# Patient Record
Sex: FEMALE | Race: BLACK OR AFRICAN AMERICAN | Employment: FULL TIME | URBAN - METROPOLITAN AREA
[De-identification: names, ages, dates, MRNs, and addresses within clinical notes are randomized per-mention and may not be internally consistent; named-entity substitution may affect disease eponyms.]

---

## 2018-03-29 ENCOUNTER — HOSPITAL ENCOUNTER (EMERGENCY)
Age: 38
Discharge: HOME | End: 2018-03-29
Attending: EMERGENCY MEDICINE
Payer: COMMERCIAL

## 2018-03-29 ENCOUNTER — APPOINTMENT (OUTPATIENT)
Dept: CT IMAGING | Age: 38
End: 2018-03-29
Payer: COMMERCIAL

## 2018-03-29 VITALS
HEIGHT: 65 IN | TEMPERATURE: 97.8 F | DIASTOLIC BLOOD PRESSURE: 75 MMHG | SYSTOLIC BLOOD PRESSURE: 142 MMHG | RESPIRATION RATE: 18 BRPM | HEART RATE: 74 BPM | OXYGEN SATURATION: 100 % | BODY MASS INDEX: 33.99 KG/M2 | WEIGHT: 204 LBS

## 2018-03-29 DIAGNOSIS — K52.9 ENTERITIS: Primary | ICD-10-CM

## 2018-03-29 LAB
ABSOLUTE EOS #: 0.2 K/UL (ref 0–0.4)
ABSOLUTE IMMATURE GRANULOCYTE: ABNORMAL K/UL (ref 0–0.3)
ABSOLUTE LYMPH #: 2.6 K/UL (ref 1–4.8)
ABSOLUTE MONO #: 0.5 K/UL (ref 0.2–0.8)
ANION GAP SERPL CALCULATED.3IONS-SCNC: 12 MMOL/L (ref 9–17)
BASOPHILS # BLD: 1 % (ref 0–2)
BASOPHILS ABSOLUTE: 0.1 K/UL (ref 0–0.2)
BUN BLDV-MCNC: 10 MG/DL (ref 6–20)
BUN/CREAT BLD: 13 (ref 9–20)
CALCIUM SERPL-MCNC: 9.1 MG/DL (ref 8.6–10.4)
CHLORIDE BLD-SCNC: 102 MMOL/L (ref 98–107)
CHP ED QC CHECK: NORMAL
CO2: 24 MMOL/L (ref 20–31)
CREAT SERPL-MCNC: 0.75 MG/DL (ref 0.5–0.9)
DIFFERENTIAL TYPE: ABNORMAL
EOSINOPHILS RELATIVE PERCENT: 2 % (ref 1–4)
GFR AFRICAN AMERICAN: >60 ML/MIN
GFR NON-AFRICAN AMERICAN: >60 ML/MIN
GFR SERPL CREATININE-BSD FRML MDRD: NORMAL ML/MIN/{1.73_M2}
GFR SERPL CREATININE-BSD FRML MDRD: NORMAL ML/MIN/{1.73_M2}
GLUCOSE BLD-MCNC: 99 MG/DL (ref 70–99)
HCT VFR BLD CALC: 42.9 % (ref 36–46)
HEMOGLOBIN: 14.5 G/DL (ref 12–16)
IMMATURE GRANULOCYTES: ABNORMAL %
LYMPHOCYTES # BLD: 31 % (ref 24–44)
MCH RBC QN AUTO: 27 PG (ref 26–34)
MCHC RBC AUTO-ENTMCNC: 33.9 G/DL (ref 31–37)
MCV RBC AUTO: 79.5 FL (ref 80–100)
MONOCYTES # BLD: 6 % (ref 1–7)
NRBC AUTOMATED: ABNORMAL PER 100 WBC
PDW BLD-RTO: 13.5 % (ref 11.5–14.5)
PLATELET # BLD: 285 K/UL (ref 130–400)
PLATELET ESTIMATE: ABNORMAL
PMV BLD AUTO: ABNORMAL FL (ref 6–12)
POTASSIUM SERPL-SCNC: 4.2 MMOL/L (ref 3.7–5.3)
RBC # BLD: 5.39 M/UL (ref 4–5.2)
RBC # BLD: ABNORMAL 10*6/UL
SEG NEUTROPHILS: 60 % (ref 36–66)
SEGMENTED NEUTROPHILS ABSOLUTE COUNT: 4.9 K/UL (ref 1.8–7.7)
SODIUM BLD-SCNC: 138 MMOL/L (ref 135–144)
WBC # BLD: 8.3 K/UL (ref 3.5–11)
WBC # BLD: ABNORMAL 10*3/UL

## 2018-03-29 PROCEDURE — 6360000004 HC RX CONTRAST MEDICATION: Performed by: EMERGENCY MEDICINE

## 2018-03-29 PROCEDURE — 74177 CT ABD & PELVIS W/CONTRAST: CPT

## 2018-03-29 PROCEDURE — 96374 THER/PROPH/DIAG INJ IV PUSH: CPT

## 2018-03-29 PROCEDURE — 6360000002 HC RX W HCPCS: Performed by: EMERGENCY MEDICINE

## 2018-03-29 PROCEDURE — 96375 TX/PRO/DX INJ NEW DRUG ADDON: CPT

## 2018-03-29 PROCEDURE — 81003 URINALYSIS AUTO W/O SCOPE: CPT

## 2018-03-29 PROCEDURE — 85025 COMPLETE CBC W/AUTO DIFF WBC: CPT

## 2018-03-29 PROCEDURE — 80048 BASIC METABOLIC PNL TOTAL CA: CPT

## 2018-03-29 PROCEDURE — 2580000003 HC RX 258: Performed by: EMERGENCY MEDICINE

## 2018-03-29 PROCEDURE — 99284 EMERGENCY DEPT VISIT MOD MDM: CPT

## 2018-03-29 RX ORDER — DICYCLOMINE HYDROCHLORIDE 10 MG/1
10 CAPSULE ORAL
COMMUNITY

## 2018-03-29 RX ORDER — ONDANSETRON 4 MG/1
4 TABLET, ORALLY DISINTEGRATING ORAL EVERY 6 HOURS PRN
Qty: 12 TABLET | Refills: 0 | Status: SHIPPED | OUTPATIENT
Start: 2018-03-29 | End: 2018-12-09

## 2018-03-29 RX ORDER — ONDANSETRON 2 MG/ML
4 INJECTION INTRAMUSCULAR; INTRAVENOUS ONCE
Status: COMPLETED | OUTPATIENT
Start: 2018-03-29 | End: 2018-03-29

## 2018-03-29 RX ORDER — HYDROCODONE BITARTRATE AND ACETAMINOPHEN 5; 325 MG/1; MG/1
1 TABLET ORAL EVERY 6 HOURS PRN
Qty: 20 TABLET | Refills: 0 | Status: SHIPPED | OUTPATIENT
Start: 2018-03-29 | End: 2018-04-03

## 2018-03-29 RX ORDER — 0.9 % SODIUM CHLORIDE 0.9 %
50 INTRAVENOUS SOLUTION INTRAVENOUS ONCE
Status: COMPLETED | OUTPATIENT
Start: 2018-03-29 | End: 2018-03-29

## 2018-03-29 RX ORDER — MORPHINE SULFATE 4 MG/ML
4 INJECTION, SOLUTION INTRAMUSCULAR; INTRAVENOUS ONCE
Status: COMPLETED | OUTPATIENT
Start: 2018-03-29 | End: 2018-03-29

## 2018-03-29 RX ORDER — SODIUM CHLORIDE 0.9 % (FLUSH) 0.9 %
10 SYRINGE (ML) INJECTION
Status: COMPLETED | OUTPATIENT
Start: 2018-03-29 | End: 2018-03-29

## 2018-03-29 RX ADMIN — MORPHINE SULFATE 4 MG: 4 INJECTION INTRAVENOUS at 08:18

## 2018-03-29 RX ADMIN — IOPAMIDOL 125 ML: 755 INJECTION, SOLUTION INTRAVENOUS at 08:27

## 2018-03-29 RX ADMIN — SODIUM CHLORIDE 100 ML: 9 INJECTION, SOLUTION INTRAVENOUS at 08:18

## 2018-03-29 RX ADMIN — ONDANSETRON 4 MG: 2 INJECTION INTRAMUSCULAR; INTRAVENOUS at 08:17

## 2018-03-29 RX ADMIN — Medication 10 ML: at 08:27

## 2018-03-29 ASSESSMENT — ENCOUNTER SYMPTOMS
COLOR CHANGE: 0
FACIAL SWELLING: 0
CONSTIPATION: 0
EYE REDNESS: 0
SHORTNESS OF BREATH: 0
DIARRHEA: 0
ABDOMINAL PAIN: 1
VOMITING: 0
EYE DISCHARGE: 0
COUGH: 0

## 2018-03-29 ASSESSMENT — PAIN DESCRIPTION - DIRECTION: RADIATING_TOWARDS: VAGINA

## 2018-03-29 ASSESSMENT — PAIN DESCRIPTION - ONSET: ONSET: ON-GOING

## 2018-03-29 ASSESSMENT — PAIN DESCRIPTION - FREQUENCY: FREQUENCY: CONTINUOUS

## 2018-03-29 ASSESSMENT — PAIN DESCRIPTION - PROGRESSION: CLINICAL_PROGRESSION: GRADUALLY WORSENING

## 2018-03-29 ASSESSMENT — PAIN SCALES - GENERAL: PAINLEVEL_OUTOF10: 10

## 2018-03-29 ASSESSMENT — PAIN DESCRIPTION - LOCATION: LOCATION: ABDOMEN

## 2018-03-29 ASSESSMENT — PAIN DESCRIPTION - ORIENTATION: ORIENTATION: LOWER

## 2018-03-29 ASSESSMENT — PAIN DESCRIPTION - DESCRIPTORS: DESCRIPTORS: CONSTANT

## 2018-03-29 ASSESSMENT — PAIN DESCRIPTION - PAIN TYPE: TYPE: ACUTE PAIN

## 2018-12-09 ENCOUNTER — HOSPITAL ENCOUNTER (EMERGENCY)
Age: 38
Discharge: HOME | End: 2018-12-09
Attending: EMERGENCY MEDICINE
Payer: COMMERCIAL

## 2018-12-09 VITALS
WEIGHT: 206.5 LBS | HEART RATE: 74 BPM | RESPIRATION RATE: 18 BRPM | HEIGHT: 65 IN | DIASTOLIC BLOOD PRESSURE: 87 MMHG | BODY MASS INDEX: 34.41 KG/M2 | OXYGEN SATURATION: 98 % | TEMPERATURE: 98.5 F | SYSTOLIC BLOOD PRESSURE: 172 MMHG

## 2018-12-09 DIAGNOSIS — G43.909 MIGRAINE WITHOUT STATUS MIGRAINOSUS, NOT INTRACTABLE, UNSPECIFIED MIGRAINE TYPE: Primary | ICD-10-CM

## 2018-12-09 PROCEDURE — 96361 HYDRATE IV INFUSION ADD-ON: CPT

## 2018-12-09 PROCEDURE — 96375 TX/PRO/DX INJ NEW DRUG ADDON: CPT

## 2018-12-09 PROCEDURE — 6360000002 HC RX W HCPCS: Performed by: PHYSICIAN ASSISTANT

## 2018-12-09 PROCEDURE — 99283 EMERGENCY DEPT VISIT LOW MDM: CPT

## 2018-12-09 PROCEDURE — 2580000003 HC RX 258: Performed by: PHYSICIAN ASSISTANT

## 2018-12-09 PROCEDURE — 96374 THER/PROPH/DIAG INJ IV PUSH: CPT

## 2018-12-09 RX ORDER — METOCLOPRAMIDE HYDROCHLORIDE 5 MG/ML
10 INJECTION INTRAMUSCULAR; INTRAVENOUS ONCE
Status: COMPLETED | OUTPATIENT
Start: 2018-12-09 | End: 2018-12-09

## 2018-12-09 RX ORDER — 0.9 % SODIUM CHLORIDE 0.9 %
1000 INTRAVENOUS SOLUTION INTRAVENOUS ONCE
Status: COMPLETED | OUTPATIENT
Start: 2018-12-09 | End: 2018-12-09

## 2018-12-09 RX ORDER — BUTALBITAL, ACETAMINOPHEN AND CAFFEINE 50; 325; 40 MG/1; MG/1; MG/1
1 TABLET ORAL EVERY 4 HOURS PRN
Qty: 20 TABLET | Refills: 0 | Status: SHIPPED | OUTPATIENT
Start: 2018-12-09

## 2018-12-09 RX ORDER — HYOSCYAMINE SULFATE 0.125 MG
125 TABLET ORAL
COMMUNITY
Start: 2018-11-15

## 2018-12-09 RX ORDER — ONDANSETRON 4 MG/1
4 TABLET, ORALLY DISINTEGRATING ORAL EVERY 8 HOURS PRN
Qty: 20 TABLET | Refills: 0 | Status: SHIPPED | OUTPATIENT
Start: 2018-12-09

## 2018-12-09 RX ORDER — DIPHENHYDRAMINE HYDROCHLORIDE 50 MG/ML
25 INJECTION INTRAMUSCULAR; INTRAVENOUS ONCE
Status: COMPLETED | OUTPATIENT
Start: 2018-12-09 | End: 2018-12-09

## 2018-12-09 RX ORDER — KETOROLAC TROMETHAMINE 30 MG/ML
30 INJECTION, SOLUTION INTRAMUSCULAR; INTRAVENOUS ONCE
Status: COMPLETED | OUTPATIENT
Start: 2018-12-09 | End: 2018-12-09

## 2018-12-09 RX ADMIN — DIPHENHYDRAMINE HYDROCHLORIDE 25 MG: 50 INJECTION, SOLUTION INTRAMUSCULAR; INTRAVENOUS at 18:32

## 2018-12-09 RX ADMIN — SODIUM CHLORIDE 1000 ML: 9 INJECTION, SOLUTION INTRAVENOUS at 18:32

## 2018-12-09 RX ADMIN — METOCLOPRAMIDE 10 MG: 5 INJECTION, SOLUTION INTRAMUSCULAR; INTRAVENOUS at 18:33

## 2018-12-09 RX ADMIN — KETOROLAC TROMETHAMINE 30 MG: 30 INJECTION, SOLUTION INTRAMUSCULAR at 18:32

## 2018-12-09 ASSESSMENT — PAIN SCALES - GENERAL
PAINLEVEL_OUTOF10: 3
PAINLEVEL_OUTOF10: 8

## 2018-12-09 ASSESSMENT — PAIN DESCRIPTION - DESCRIPTORS: DESCRIPTORS: ACHING

## 2018-12-09 ASSESSMENT — PAIN DESCRIPTION - PAIN TYPE: TYPE: ACUTE PAIN

## 2018-12-09 NOTE — ED NOTES
Patient c/o headache that started Thursday and has progressively gotten worse. Carey Fanny states she has been taking ibuprofen 800mg, last dose was at 1500 today, but did not help with the pain. Patient started to have N/V today so came to ER because of pain and symptoms getting worse. Patient rates pain an 8 on a 0-10 scale at this time, lying in the dark with eyes closed.        Naye Richardson RN  12/09/18 0972

## 2020-02-25 ENCOUNTER — HOSPITAL ENCOUNTER (EMERGENCY)
Age: 40
Discharge: HOME | End: 2020-02-25
Attending: EMERGENCY MEDICINE
Payer: COMMERCIAL

## 2020-02-25 ENCOUNTER — APPOINTMENT (OUTPATIENT)
Dept: GENERAL RADIOLOGY | Age: 40
End: 2020-02-25
Payer: COMMERCIAL

## 2020-02-25 VITALS
WEIGHT: 204 LBS | BODY MASS INDEX: 33.99 KG/M2 | OXYGEN SATURATION: 96 % | RESPIRATION RATE: 18 BRPM | HEART RATE: 56 BPM | HEIGHT: 65 IN | SYSTOLIC BLOOD PRESSURE: 137 MMHG | DIASTOLIC BLOOD PRESSURE: 81 MMHG | TEMPERATURE: 98.7 F

## 2020-02-25 LAB
ABSOLUTE EOS #: 0.3 K/UL (ref 0–0.44)
ABSOLUTE IMMATURE GRANULOCYTE: 0.05 K/UL (ref 0–0.3)
ABSOLUTE LYMPH #: 3.71 K/UL (ref 1.1–3.7)
ABSOLUTE MONO #: 0.96 K/UL (ref 0.1–1.2)
ALBUMIN SERPL-MCNC: 3.8 G/DL (ref 3.5–5.2)
ALBUMIN/GLOBULIN RATIO: ABNORMAL (ref 1–2.5)
ALP BLD-CCNC: 54 U/L (ref 35–104)
ALT SERPL-CCNC: 16 U/L (ref 5–33)
AMYLASE: 26 U/L (ref 28–100)
ANION GAP SERPL CALCULATED.3IONS-SCNC: 11 MMOL/L (ref 9–17)
AST SERPL-CCNC: 23 U/L
BASOPHILS # BLD: 0 % (ref 0–2)
BASOPHILS ABSOLUTE: 0.04 K/UL (ref 0–0.2)
BILIRUB SERPL-MCNC: 0.19 MG/DL (ref 0.3–1.2)
BILIRUBIN DIRECT: <0.08 MG/DL
BILIRUBIN, INDIRECT: ABNORMAL MG/DL (ref 0–1)
BUN BLDV-MCNC: 14 MG/DL (ref 6–20)
BUN/CREAT BLD: 19 (ref 9–20)
CALCIUM SERPL-MCNC: 8.7 MG/DL (ref 8.6–10.4)
CHLORIDE BLD-SCNC: 100 MMOL/L (ref 98–107)
CO2: 24 MMOL/L (ref 20–31)
CREAT SERPL-MCNC: 0.75 MG/DL (ref 0.5–0.9)
DIFFERENTIAL TYPE: ABNORMAL
EOSINOPHILS RELATIVE PERCENT: 2 % (ref 1–4)
GFR AFRICAN AMERICAN: >60 ML/MIN
GFR NON-AFRICAN AMERICAN: >60 ML/MIN
GFR SERPL CREATININE-BSD FRML MDRD: ABNORMAL ML/MIN/{1.73_M2}
GFR SERPL CREATININE-BSD FRML MDRD: ABNORMAL ML/MIN/{1.73_M2}
GLOBULIN: ABNORMAL G/DL (ref 1.5–3.8)
GLUCOSE BLD-MCNC: 117 MG/DL (ref 70–99)
HCT VFR BLD CALC: 41.5 % (ref 36.3–47.1)
HEMOGLOBIN: 13.2 G/DL (ref 11.9–15.1)
IMMATURE GRANULOCYTES: 0 %
LIPASE: 27 U/L (ref 13–60)
LYMPHOCYTES # BLD: 30 % (ref 24–43)
MCH RBC QN AUTO: 26.3 PG (ref 25.2–33.5)
MCHC RBC AUTO-ENTMCNC: 31.8 G/DL (ref 28.4–34.8)
MCV RBC AUTO: 82.8 FL (ref 82.6–102.9)
MONOCYTES # BLD: 8 % (ref 3–12)
NRBC AUTOMATED: 0 PER 100 WBC
PDW BLD-RTO: 15.3 % (ref 11.8–14.4)
PLATELET # BLD: 265 K/UL (ref 138–453)
PLATELET ESTIMATE: ABNORMAL
PMV BLD AUTO: 9.8 FL (ref 8.1–13.5)
POTASSIUM SERPL-SCNC: 3.9 MMOL/L (ref 3.7–5.3)
RBC # BLD: 5.01 M/UL (ref 3.95–5.11)
RBC # BLD: ABNORMAL 10*6/UL
SEG NEUTROPHILS: 60 % (ref 36–65)
SEGMENTED NEUTROPHILS ABSOLUTE COUNT: 7.24 K/UL (ref 1.5–8.1)
SODIUM BLD-SCNC: 135 MMOL/L (ref 135–144)
TOTAL PROTEIN: 6.8 G/DL (ref 6.4–8.3)
WBC # BLD: 12.3 K/UL (ref 3.5–11.3)
WBC # BLD: ABNORMAL 10*3/UL

## 2020-02-25 PROCEDURE — 82150 ASSAY OF AMYLASE: CPT

## 2020-02-25 PROCEDURE — 96375 TX/PRO/DX INJ NEW DRUG ADDON: CPT

## 2020-02-25 PROCEDURE — C9113 INJ PANTOPRAZOLE SODIUM, VIA: HCPCS | Performed by: EMERGENCY MEDICINE

## 2020-02-25 PROCEDURE — 99284 EMERGENCY DEPT VISIT MOD MDM: CPT

## 2020-02-25 PROCEDURE — 85025 COMPLETE CBC W/AUTO DIFF WBC: CPT

## 2020-02-25 PROCEDURE — 74022 RADEX COMPL AQT ABD SERIES: CPT

## 2020-02-25 PROCEDURE — 83690 ASSAY OF LIPASE: CPT

## 2020-02-25 PROCEDURE — 6360000002 HC RX W HCPCS: Performed by: EMERGENCY MEDICINE

## 2020-02-25 PROCEDURE — 2580000003 HC RX 258: Performed by: EMERGENCY MEDICINE

## 2020-02-25 PROCEDURE — 96372 THER/PROPH/DIAG INJ SC/IM: CPT

## 2020-02-25 PROCEDURE — 80076 HEPATIC FUNCTION PANEL: CPT

## 2020-02-25 PROCEDURE — 96374 THER/PROPH/DIAG INJ IV PUSH: CPT

## 2020-02-25 PROCEDURE — 80048 BASIC METABOLIC PNL TOTAL CA: CPT

## 2020-02-25 RX ORDER — DICYCLOMINE HYDROCHLORIDE 10 MG/1
10 CAPSULE ORAL EVERY 6 HOURS PRN
Qty: 20 CAPSULE | Refills: 0 | Status: SHIPPED | OUTPATIENT
Start: 2020-02-25

## 2020-02-25 RX ORDER — HYDROMORPHONE HYDROCHLORIDE 1 MG/ML
0.5 INJECTION, SOLUTION INTRAMUSCULAR; INTRAVENOUS; SUBCUTANEOUS ONCE
Status: COMPLETED | OUTPATIENT
Start: 2020-02-25 | End: 2020-02-25

## 2020-02-25 RX ORDER — ONDANSETRON 2 MG/ML
8 INJECTION INTRAMUSCULAR; INTRAVENOUS ONCE
Status: COMPLETED | OUTPATIENT
Start: 2020-02-25 | End: 2020-02-25

## 2020-02-25 RX ORDER — PANTOPRAZOLE SODIUM 40 MG/10ML
40 INJECTION, POWDER, LYOPHILIZED, FOR SOLUTION INTRAVENOUS ONCE
Status: COMPLETED | OUTPATIENT
Start: 2020-02-25 | End: 2020-02-25

## 2020-02-25 RX ORDER — OXYCODONE HYDROCHLORIDE AND ACETAMINOPHEN 5; 325 MG/1; MG/1
1 TABLET ORAL EVERY 6 HOURS PRN
Qty: 6 TABLET | Refills: 0 | Status: SHIPPED | OUTPATIENT
Start: 2020-02-25 | End: 2020-02-28

## 2020-02-25 RX ORDER — 0.9 % SODIUM CHLORIDE 0.9 %
1000 INTRAVENOUS SOLUTION INTRAVENOUS ONCE
Status: COMPLETED | OUTPATIENT
Start: 2020-02-25 | End: 2020-02-25

## 2020-02-25 RX ORDER — ONDANSETRON 4 MG/1
4 TABLET, ORALLY DISINTEGRATING ORAL EVERY 8 HOURS PRN
Qty: 20 TABLET | Refills: 0 | Status: SHIPPED | OUTPATIENT
Start: 2020-02-25

## 2020-02-25 RX ORDER — SODIUM CHLORIDE 9 MG/ML
10 INJECTION INTRAVENOUS ONCE
Status: COMPLETED | OUTPATIENT
Start: 2020-02-25 | End: 2020-02-25

## 2020-02-25 RX ORDER — DICYCLOMINE HYDROCHLORIDE 10 MG/ML
20 INJECTION INTRAMUSCULAR ONCE
Status: COMPLETED | OUTPATIENT
Start: 2020-02-25 | End: 2020-02-25

## 2020-02-25 RX ADMIN — PANTOPRAZOLE SODIUM 40 MG: 40 INJECTION, POWDER, FOR SOLUTION INTRAVENOUS at 00:50

## 2020-02-25 RX ADMIN — Medication 10 ML: at 00:51

## 2020-02-25 RX ADMIN — DICYCLOMINE HYDROCHLORIDE 20 MG: 10 INJECTION INTRAMUSCULAR at 00:51

## 2020-02-25 RX ADMIN — ONDANSETRON 8 MG: 2 INJECTION INTRAMUSCULAR; INTRAVENOUS at 00:50

## 2020-02-25 RX ADMIN — HYDROMORPHONE HYDROCHLORIDE 0.5 MG: 1 INJECTION, SOLUTION INTRAMUSCULAR; INTRAVENOUS; SUBCUTANEOUS at 00:50

## 2020-02-25 RX ADMIN — SODIUM CHLORIDE 1000 ML: 9 INJECTION, SOLUTION INTRAVENOUS at 00:50

## 2020-02-25 ASSESSMENT — PAIN SCALES - GENERAL
PAINLEVEL_OUTOF10: 3
PAINLEVEL_OUTOF10: 10

## 2020-02-25 NOTE — ED PROVIDER NOTES
LABS:  Labs Reviewed   AMYLASE - Abnormal; Notable for the following components:       Result Value    Amylase 26 (*)     All other components within normal limits   BASIC METABOLIC PANEL - Abnormal; Notable for the following components:    Glucose 117 (*)     All other components within normal limits   CBC WITH AUTO DIFFERENTIAL - Abnormal; Notable for the following components:    WBC 12.3 (*)     RDW 15.3 (*)     Absolute Lymph # 3.71 (*)     All other components within normal limits   HEPATIC FUNCTION PANEL - Abnormal; Notable for the following components: Total Bilirubin 0.19 (*)     All other components within normal limits   LIPASE   URINALYSIS       All other labs were within normal range or not returned as of this dictation. EMERGENCY DEPARTMENT COURSE and DIFFERENTIAL DIAGNOSIS/MDM:   Vitals:    Vitals:    02/24/20 2329   BP: (!) 141/85   Pulse: 64   Resp: 16   Temp: 98.7 °F (37.1 °C)   TempSrc: Oral   SpO2: 98%   Weight: 204 lb (92.5 kg)   Height: 5' 5\" (1.651 m)     Patient is evaluated. Her abdominal exam is benign. Investigations also benign. She is treated symptomatically. Presentation is more consistent with flare of her underlying IBS. She will be discharged home on continued symptomatic management. She is advised follow-up with family physician or her treating gastroenterologist.    CONSULTS:  None    PROCEDURES:  None    FINAL IMPRESSION      1. Generalized abdominal pain    2.  Irritable bowel syndrome, unspecified type          DISPOSITION/PLAN   DISPOSITION     Decision to Discharge    PATIENT REFERRED TO:   Saji Sanchez, APRN - [facility]  [address] [facility]  [address] [facility] [phone]    Schedule an appointment as soon as possible for a visit       Jinny Cushing, MD  [facility] [facility] [facility]  [phone]    Schedule an appointment as soon as possible for a visit         DISCHARGE MEDICATIONS:     New Prescriptions    DICYCLOMINE (BENTYL) 10

## 2021-05-05 ENCOUNTER — HOSPITAL ENCOUNTER (OUTPATIENT)
Dept: INTERVENTIONAL RADIOLOGY/VASCULAR | Age: 41
Discharge: HOME | End: 2021-05-07
Payer: COMMERCIAL

## 2021-05-05 ENCOUNTER — HOSPITAL ENCOUNTER (OUTPATIENT)
Dept: MRI IMAGING | Age: 41
Discharge: HOME | End: 2021-05-07
Payer: COMMERCIAL

## 2021-05-05 VITALS — HEIGHT: 65 IN | BODY MASS INDEX: 34.99 KG/M2 | WEIGHT: 210 LBS

## 2021-05-05 DIAGNOSIS — M25.511 ACUTE PAIN OF RIGHT SHOULDER: ICD-10-CM

## 2021-05-05 PROCEDURE — A9579 GAD-BASE MR CONTRAST NOS,1ML: HCPCS | Performed by: NURSE PRACTITIONER

## 2021-05-05 PROCEDURE — 77002 NEEDLE LOCALIZATION BY XRAY: CPT | Performed by: RADIOLOGY

## 2021-05-05 PROCEDURE — 6360000004 HC RX CONTRAST MEDICATION: Performed by: NURSE PRACTITIONER

## 2021-05-05 PROCEDURE — 73222 MRI JOINT UPR EXTREM W/DYE: CPT

## 2021-05-05 PROCEDURE — 2709999900 IR ARTHR/ASP/INJ MAJOR JT/BURSA RIGHT WO US

## 2021-05-05 PROCEDURE — 23350 INJECTION FOR SHOULDER X-RAY: CPT | Performed by: RADIOLOGY

## 2021-05-05 RX ADMIN — IOHEXOL 14 ML: 240 INJECTION, SOLUTION INTRATHECAL; INTRAVASCULAR; INTRAVENOUS; ORAL at 09:11

## 2021-05-05 RX ADMIN — GADOTERIDOL 1 ML: 279.3 INJECTION, SOLUTION INTRAVENOUS at 09:28

## 2021-05-05 NOTE — PROGRESS NOTES
Right shoulder arthrogram complete. 14 ml Contrast injected as ordered. Site cleansed off and band aid applied. Pt tolerated procedure well and taken ambulatory to MRI for images.

## 2023-06-20 RX ORDER — SEMAGLUTIDE 1.7 MG/.75ML
2.4 INJECTION, SOLUTION SUBCUTANEOUS
Qty: 3 ML | Refills: 3 | Status: SHIPPED | OUTPATIENT
Start: 2023-06-20

## 2023-06-20 NOTE — TELEPHONE ENCOUNTER
Patient missed her last appt. Please let her know that I sent in the next dosage of the wegovy. She will inject 2.4mg weekly. She should come in for an appt soon to see how shes tolerating it. I'll be gone so she will see Dr. Kiser .  Thank you

## 2023-07-12 ENCOUNTER — OFFICE VISIT (OUTPATIENT)
Age: 43
End: 2023-07-12

## 2023-07-12 VITALS
RESPIRATION RATE: 12 BRPM | HEART RATE: 57 BPM | DIASTOLIC BLOOD PRESSURE: 70 MMHG | SYSTOLIC BLOOD PRESSURE: 122 MMHG | WEIGHT: 204.6 LBS | HEIGHT: 65 IN | BODY MASS INDEX: 34.09 KG/M2

## 2023-07-12 DIAGNOSIS — G89.29 CHRONIC BILATERAL LOW BACK PAIN WITH BILATERAL SCIATICA: Primary | ICD-10-CM

## 2023-07-12 DIAGNOSIS — M54.41 CHRONIC BILATERAL LOW BACK PAIN WITH BILATERAL SCIATICA: Primary | ICD-10-CM

## 2023-07-12 DIAGNOSIS — K58.2 IRRITABLE BOWEL SYNDROME WITH BOTH CONSTIPATION AND DIARRHEA: ICD-10-CM

## 2023-07-12 DIAGNOSIS — E66.09 CLASS 1 OBESITY DUE TO EXCESS CALORIES WITHOUT SERIOUS COMORBIDITY WITH BODY MASS INDEX (BMI) OF 34.0 TO 34.9 IN ADULT: ICD-10-CM

## 2023-07-12 DIAGNOSIS — M54.42 CHRONIC BILATERAL LOW BACK PAIN WITH BILATERAL SCIATICA: Primary | ICD-10-CM

## 2023-07-12 PROBLEM — K58.9 IRRITABLE BOWEL SYNDROME: Status: ACTIVE | Noted: 2023-07-12

## 2023-07-12 RX ORDER — FERROUS SULFATE 325(65) MG
1 TABLET ORAL EVERY OTHER DAY
COMMUNITY
Start: 2023-05-09

## 2023-07-12 RX ORDER — OMEPRAZOLE 20 MG/1
20 CAPSULE, DELAYED RELEASE ORAL DAILY
COMMUNITY
Start: 2023-06-25

## 2023-07-12 RX ORDER — ALBUTEROL SULFATE 90 UG/1
2 AEROSOL, METERED RESPIRATORY (INHALATION)
COMMUNITY
Start: 2023-05-08

## 2023-07-12 RX ORDER — LORATADINE 10 MG/1
10 CAPSULE, LIQUID FILLED ORAL DAILY PRN
COMMUNITY
Start: 2021-10-22

## 2023-07-12 RX ORDER — DILTIAZEM HYDROCHLORIDE 60 MG/1
2 TABLET, FILM COATED ORAL 2 TIMES DAILY
COMMUNITY
Start: 2023-05-12

## 2023-07-12 SDOH — ECONOMIC STABILITY: FOOD INSECURITY: WITHIN THE PAST 12 MONTHS, YOU WORRIED THAT YOUR FOOD WOULD RUN OUT BEFORE YOU GOT MONEY TO BUY MORE.: NEVER TRUE

## 2023-07-12 SDOH — ECONOMIC STABILITY: INCOME INSECURITY: HOW HARD IS IT FOR YOU TO PAY FOR THE VERY BASICS LIKE FOOD, HOUSING, MEDICAL CARE, AND HEATING?: NOT HARD AT ALL

## 2023-07-12 SDOH — ECONOMIC STABILITY: FOOD INSECURITY: WITHIN THE PAST 12 MONTHS, THE FOOD YOU BOUGHT JUST DIDN'T LAST AND YOU DIDN'T HAVE MONEY TO GET MORE.: NEVER TRUE

## 2023-07-12 SDOH — ECONOMIC STABILITY: HOUSING INSECURITY
IN THE LAST 12 MONTHS, WAS THERE A TIME WHEN YOU DID NOT HAVE A STEADY PLACE TO SLEEP OR SLEPT IN A SHELTER (INCLUDING NOW)?: NO

## 2023-07-12 ASSESSMENT — PATIENT HEALTH QUESTIONNAIRE - PHQ9
2. FEELING DOWN, DEPRESSED OR HOPELESS: 0
1. LITTLE INTEREST OR PLEASURE IN DOING THINGS: 0
SUM OF ALL RESPONSES TO PHQ QUESTIONS 1-9: 0
SUM OF ALL RESPONSES TO PHQ QUESTIONS 1-9: 0
SUM OF ALL RESPONSES TO PHQ9 QUESTIONS 1 & 2: 0
SUM OF ALL RESPONSES TO PHQ QUESTIONS 1-9: 0
SUM OF ALL RESPONSES TO PHQ QUESTIONS 1-9: 0

## 2023-07-12 ASSESSMENT — ENCOUNTER SYMPTOMS
VOMITING: 0
DIARRHEA: 0
ABDOMINAL PAIN: 0
CHEST TIGHTNESS: 0
SHORTNESS OF BREATH: 0
COUGH: 0
BACK PAIN: 1
NAUSEA: 0
CONSTIPATION: 1

## 2023-07-14 ENCOUNTER — TELEPHONE (OUTPATIENT)
Age: 43
End: 2023-07-14

## 2023-07-24 ENCOUNTER — OFFICE VISIT (OUTPATIENT)
Age: 43
End: 2023-07-24

## 2023-07-24 VITALS
HEART RATE: 75 BPM | TEMPERATURE: 98.1 F | BODY MASS INDEX: 33.45 KG/M2 | DIASTOLIC BLOOD PRESSURE: 63 MMHG | SYSTOLIC BLOOD PRESSURE: 111 MMHG | RESPIRATION RATE: 16 BRPM | WEIGHT: 201 LBS

## 2023-07-24 DIAGNOSIS — E66.09 CLASS 1 OBESITY DUE TO EXCESS CALORIES WITHOUT SERIOUS COMORBIDITY WITH BODY MASS INDEX (BMI) OF 33.0 TO 33.9 IN ADULT: Primary | ICD-10-CM

## 2023-07-24 RX ORDER — SEMAGLUTIDE 1.7 MG/.75ML
2.4 INJECTION, SOLUTION SUBCUTANEOUS
Qty: 3 ML | Refills: 3 | Status: SHIPPED | OUTPATIENT
Start: 2023-07-24

## 2023-07-24 NOTE — PROGRESS NOTES
[address] [facility]  [address] [facility], [facility]  [place]   Phone: [phone]  Fax: [phone]      Date of Visit:  2023  Patient Name: Carlos Vences   Patient :  1980     HPI:     Carlos Vences is a 39 y.o. female who presents today to discuss weight loss. Chief Complaint   Patient presents with    Follow-up     Wt loss/check     Previous weight   2023 204 LBS    Current weight  2023 201 LBS    Patient reports that she is tolerating the medication well. She is currently taking 2.4 mg subcutaneously every 7 days. She denies any upset stomach, nausea, vomiting or lightheadedness. She states that the medication has helped with feelings of fullness and satiety. Her current diet includes fruit, sandwiches and water. She denies any difficulty falling asleep. She is currently going to the gym after work working on the treadmill for about 1 hour/day. I personally reviewed the patient's past medical history, current medications, allergies, surgical history, family history and social history. Updates were made as necessary. REVIEW OF SYSTEM      Review of Systems   Constitutional:  Negative for chills, fatigue and fever. Respiratory:  Negative for shortness of breath. Cardiovascular:  Negative for chest pain. Gastrointestinal:  Negative for diarrhea, nausea and vomiting. Neurological:  Negative for dizziness, syncope, light-headedness and headaches.      REVIEWED INFORMATION      No Known Allergies    Patient Active Problem List   Diagnosis    Thyroid enlarged    Dysmenorrhea    Pelvic cramping    Abnormal uterine bleeding (AUB)    DUB (dysfunctional uterine bleeding)    Abdominal pain    Dysuria    Vaginal discharge    Herpes dermatitis    Herpes genitalis in women    Vulvar lesion    Tongue lesion    Acute cystitis with hematuria    Chronic bilateral low back pain with bilateral sciatica    Irritable bowel syndrome

## 2023-07-24 NOTE — PATIENT INSTRUCTIONS
Thank you for following up with us at [facility] outpatient residency clinic. It was a pleasure to see you today. Our plan is the following:  -Great job with your weight loss, you're doing awesome.  -We will continue with the current dose of Wegovy. A refill has been sent to your pharmacy.  -Continue with the fruit/vegetables and plenty of water  -We will follow up in 3 months to see how things are going    If you have any additional questions or concerns, please call the office ([phone]) and speak to one of the staff. They will triage and forward the message to the doctors. Have a great rest of your day.

## 2023-07-26 ENCOUNTER — TELEPHONE (OUTPATIENT)
Age: 43
End: 2023-07-26

## 2023-07-26 NOTE — TELEPHONE ENCOUNTER
[facility] pharmacist called states BMBBGE med sent does not match directions. Asking for script to be revised and resent. 2.02

## 2023-07-28 ENCOUNTER — OFFICE VISIT (OUTPATIENT)
Age: 43
End: 2023-07-28
Payer: COMMERCIAL

## 2023-07-28 VITALS
WEIGHT: 202.2 LBS | BODY MASS INDEX: 33.65 KG/M2 | SYSTOLIC BLOOD PRESSURE: 103 MMHG | DIASTOLIC BLOOD PRESSURE: 59 MMHG | RESPIRATION RATE: 12 BRPM | HEART RATE: 70 BPM

## 2023-07-28 DIAGNOSIS — R10.31 BILATERAL LOWER ABDOMINAL DISCOMFORT: ICD-10-CM

## 2023-07-28 DIAGNOSIS — N89.8 VAGINAL DRYNESS: Primary | ICD-10-CM

## 2023-07-28 DIAGNOSIS — N95.1 MENOPAUSAL SWEATS: ICD-10-CM

## 2023-07-28 DIAGNOSIS — R10.32 BILATERAL LOWER ABDOMINAL DISCOMFORT: ICD-10-CM

## 2023-07-28 PROCEDURE — 1036F TOBACCO NON-USER: CPT | Performed by: FAMILY MEDICINE

## 2023-07-28 PROCEDURE — 99213 OFFICE O/P EST LOW 20 MIN: CPT | Performed by: FAMILY MEDICINE

## 2023-07-28 PROCEDURE — G8417 CALC BMI ABV UP PARAM F/U: HCPCS | Performed by: FAMILY MEDICINE

## 2023-07-28 PROCEDURE — G8427 DOCREV CUR MEDS BY ELIG CLIN: HCPCS | Performed by: FAMILY MEDICINE

## 2023-07-28 ASSESSMENT — LIFESTYLE VARIABLES
HOW OFTEN DO YOU HAVE A DRINK CONTAINING ALCOHOL: 2-4 TIMES A MONTH
HOW MANY STANDARD DRINKS CONTAINING ALCOHOL DO YOU HAVE ON A TYPICAL DAY: 1 OR 2

## 2023-07-28 NOTE — PROGRESS NOTES
[address]  [address] [facility], [address] [facility]  Phone: [phone]  Fax: [phone]      Date of Visit:  2023  Patient Name: Jennifer Dominguez   Patient :  1980     HPI:     Jennifer Dominguez is a 39 y.o. female who presents today to discuss vaginal discomfort. Chief Complaint   Patient presents with    Other     Vaginal dryness x 2 weeks       Patient reports that she has been having vaginal dryness and discomfort for the past two weeks. She states that she had similar symptoms 6 months ago when she was diagnosed with bacterial vaginosis and candidiasis. Patient reports white vaginal discharge that started 5 days ago. She states that she has been having suprapubic pain for the past day. She has taken Tylenol for the pain. Patient denies vaginal bleeding, dysuria, increased urinary frequency, and dyspareunia. No nausea, vomiting, fever, or chills. Negative for new sexual partners. I personally reviewed the patient's past medical history, current medications, allergies, surgical history, family history and social history. Updates were made as necessary. REVIEW OF SYSTEM      As noted above in HPI.     REVIEWED INFORMATION      No Known Allergies    Patient Active Problem List   Diagnosis    Thyroid enlarged    Dysmenorrhea    Pelvic cramping    Abnormal uterine bleeding (AUB)    DUB (dysfunctional uterine bleeding)    Abdominal pain    Dysuria    Vaginal discharge    Herpes dermatitis    Herpes genitalis in women    Vulvar lesion    Tongue lesion    Acute cystitis with hematuria    Chronic bilateral low back pain with bilateral sciatica    Irritable bowel syndrome       Past Medical History:   Diagnosis Date    Abnormal menses     vaginal bleeding x 3 months    Anxiety     Carpal tunnel syndrome on both sides     Depression     Diarrhea     GERD (gastroesophageal reflux disease)     Herpes     IBS (irritable bowel syndrome)
[address] [facility] [facility]  [address] [facility], [address] [place]   Phone: [phone]  Fax: [phone]     Keri Mtz is a 39 y.o. female for an annual wellness exam.    HPI     Subjective:  She has been feeling fine and has had no new problems since last office visit. REVIEWED INFORMATION    I have personally reviewed the medications, PMH, PSH, FH, allergies and social history. Past Medical History:   Diagnosis Date    Abnormal menses     vaginal bleeding x 3 months    Anxiety     Carpal tunnel syndrome on both sides     Depression     Diarrhea     GERD (gastroesophageal reflux disease)     Herpes     IBS (irritable bowel syndrome)     Left lower quadrant abdominal pain     Low back pain     Left sided with Left sided sciatica    Trichimoniasis     Vitamin D deficiency     Wears glasses      Past Surgical History:   Procedure Laterality Date    APPENDECTOMY      CYST REMOVAL Right     EAR    CYSTOSCOPY  2015    HYSTERECTOMY (CERVIX STATUS UNKNOWN)  2015    ovaries remain intact    HYSTEROSCOPY  14    TUBAL LIGATION       Patient has no known allergies.   Social History     Tobacco Use    Smoking status: Former     Packs/day: 0.25     Years: 12.00     Pack years: 3.00     Types: Cigarettes     Start date:      Quit date:      Years since quittin.5     Passive exposure: Never    Smokeless tobacco: Never    Tobacco comments:     QUIT 14   Substance Use Topics    Alcohol use: Yes     Comment: 4 shots on weekends     Family History   Problem Relation Age of Onset    Hypertension Mother     Kidney Disease Father         kidney transplant recipient    Diabetes Maternal Grandmother     Hypertension Maternal Grandmother     Cancer Maternal Grandfather         prostate    Cancer Maternal Aunt         breast    Colon Cancer Maternal Aunt        Gynecologic History:  Menarche: *** yo    Menstrual
Questions/concerns answered. Patient verbalized and expressed understanding. Medications, laboratory testing, imaging, consultation, and follow up as documented in this encounter. Please be aware portions of this note were completed using voice recognition software and unforeseen errors may have occurred    Patient was {seen/discussed:85753::\"discussed\"} with {.lighthouseattendings:96367}.     Electronically signed by Mariola Guaman on 7/28/2023 at 3:17 PM

## 2023-07-28 NOTE — PATIENT INSTRUCTIONS
Thank you for following up with us at [facility] outpatient residency clinic! It was a pleasure to meet you today! Our plan is the following:  - I've ordered a lab for you to have done that will let us know if you are menopausal or not. If you are, I'll give you a call to let you know and prescribe Premarin for you, since it worked well the last time. If you have any additional questions or concerns, please call the office ([phone]) and speak to one of the staff. They will triage and forward the message to the doctors! Have a great rest of your day!

## 2023-08-19 ASSESSMENT — ENCOUNTER SYMPTOMS
NAUSEA: 0
VOMITING: 0
DIARRHEA: 0
SHORTNESS OF BREATH: 0

## 2023-08-22 RX ORDER — DICYCLOMINE HCL 20 MG
TABLET ORAL
COMMUNITY
Start: 2023-08-17 | End: 2023-08-23 | Stop reason: SDUPTHER

## 2023-08-23 ENCOUNTER — OFFICE VISIT (OUTPATIENT)
Age: 43
End: 2023-08-23

## 2023-08-23 VITALS
BODY MASS INDEX: 33.08 KG/M2 | HEART RATE: 65 BPM | WEIGHT: 198.8 LBS | DIASTOLIC BLOOD PRESSURE: 62 MMHG | RESPIRATION RATE: 12 BRPM | SYSTOLIC BLOOD PRESSURE: 118 MMHG

## 2023-08-23 DIAGNOSIS — R63.4 DRUG-INDUCED WEIGHT LOSS: ICD-10-CM

## 2023-08-23 DIAGNOSIS — R23.2 HOT FLASHES: ICD-10-CM

## 2023-08-23 DIAGNOSIS — T88.7XXA SIDE EFFECT OF MEDICATION: Primary | ICD-10-CM

## 2023-08-23 DIAGNOSIS — R63.8 ALTERATION IN APPETITE: ICD-10-CM

## 2023-08-23 DIAGNOSIS — E66.09 CLASS 1 OBESITY DUE TO EXCESS CALORIES WITHOUT SERIOUS COMORBIDITY WITH BODY MASS INDEX (BMI) OF 33.0 TO 33.9 IN ADULT: ICD-10-CM

## 2023-08-23 DIAGNOSIS — T50.905A DRUG-INDUCED WEIGHT LOSS: ICD-10-CM

## 2023-08-23 DIAGNOSIS — R45.4 IRRITABILITY: ICD-10-CM

## 2023-08-23 RX ORDER — SEMAGLUTIDE 1.7 MG/.75ML
1.7 INJECTION, SOLUTION SUBCUTANEOUS
Qty: 3 ML | Refills: 3 | Status: SHIPPED | OUTPATIENT
Start: 2023-08-23 | End: 2023-12-13

## 2023-08-23 RX ORDER — DICYCLOMINE HCL 20 MG
TABLET ORAL
Qty: 120 TABLET | Refills: 3 | Status: SHIPPED | OUTPATIENT
Start: 2023-08-23

## 2023-08-23 NOTE — PROGRESS NOTES
[address] [facility]W Family Medicine Residency  [address] [facility], [address] [facility]  Phone: [phone]  Fax: [phone]      Date of Visit: 2023  Patient Name: Erin Gimenez   Patient :  1980     ASSESSMENT/PLAN   1. Side effect of medication  2. Hot flashes  -     TSH With Reflex Ft4; Future  -     Hemoglobin A1C; Future  3. Irritability  -     TSH With Reflex Ft4; Future  -     Hemoglobin A1C; Future  4. Alteration in appetite  Comments:  changes seem tied to [address] dosing schedule, but will check some labs to make sure no underlying issues are causing symptoms  Orders:  -     TSH With Reflex Ft4; Future  -     Hemoglobin A1C; Future  5. Drug-induced weight loss  6. Class 1 obesity due to excess calories without serious comorbidity with body mass index (BMI) of 33.0 to 33.9 in adult  Comments: Will take pt back down to 1.7mg weekly dose and see if sx improve or resolve. Orders:  -     Semaglutide-Weight Management (WEGOVY) 1.7 MG/0.75ML SOAJ SC injection; Inject 1.7 mg into the skin every 7 days, Disp-3 mL, R-3Normal       Return if symptoms worsen or fail to improve. HPI    Erin Gimenez is a 39 y.o. female who presents today to discuss   Chief Complaint   Patient presents with    Other     Vag dryness irritable night sweats  lab results      Last seen by myself on 23 for concern for vulvovaginal candidiasis, but wet prep showed no signs of infection. 3555 SAlexia pyle returned 14.2. Hysterectomy in , ovaries spared. Reports that sx had improved since last appointment, vaginal dryness has improved as well. Patient has been on [address] for 5 months, currently at 2.4 mg dose for the past 2 weeks. Also noticed hot flashes approximately 2 weeks ago. When reviewing symptoms, pt noted that symptoms usually start on  and do not improve until Wednesday. Pt reports that during that time period, she has no appetite, no real thirst drive.   Discussed with

## 2023-08-23 NOTE — PATIENT INSTRUCTIONS
Thank you for following up with us at [address] [place] [address]. office! It was a pleasure to meet you today! Our plan is the following:  - Based on the pattern of your symptoms, it sounds like you are suffering from hypoglycemia (low blood sugar) as a result of the Wegovy injections. I've ordered an A1c (3mo average blood sugar) so we have a baseline level. I've also ordered a TSH level (checking thyroid function) to make sure that isn't the issue. - If your labs come back normal, you'll receive a message in [facility] letting you know. If there's an abnormality, you'll get a call from the office.    - Get your testing done before you switch your Wegovy dose. - 2 weeks after you go back down to the lower dose, please call the office or send a message through [address] [place] [address] to let me know how your symptoms are doing. If your symptoms have improved, then we know what the cause of the irritability/sweats was. If not, I'll have you call the office to schedule an appointment for further evaluation. Your medication list is included in the after visit summary. If you find any differences when compared to your medications at home, please contact the office ([phone]) to let us know. You can also call the office if you have any additional questions or concerns and speak to one of the staff. They will triage and forward the message to the provider. Have a great rest of your day!

## 2023-08-24 ENCOUNTER — TELEPHONE (OUTPATIENT)
Age: 43
End: 2023-08-24

## 2023-08-24 DIAGNOSIS — M54.42 CHRONIC BILATERAL LOW BACK PAIN WITH BILATERAL SCIATICA: ICD-10-CM

## 2023-08-24 DIAGNOSIS — G89.29 CHRONIC BILATERAL LOW BACK PAIN WITH BILATERAL SCIATICA: ICD-10-CM

## 2023-08-24 DIAGNOSIS — M54.41 CHRONIC BILATERAL LOW BACK PAIN WITH BILATERAL SCIATICA: ICD-10-CM

## 2023-11-15 ENCOUNTER — OFFICE VISIT (OUTPATIENT)
Age: 43
End: 2023-11-15
Payer: COMMERCIAL

## 2023-11-15 VITALS
HEART RATE: 75 BPM | RESPIRATION RATE: 14 BRPM | TEMPERATURE: 97.9 F | HEIGHT: 65 IN | DIASTOLIC BLOOD PRESSURE: 70 MMHG | SYSTOLIC BLOOD PRESSURE: 116 MMHG | BODY MASS INDEX: 31.39 KG/M2 | WEIGHT: 188.4 LBS

## 2023-11-15 DIAGNOSIS — E66.09 CLASS 1 OBESITY DUE TO EXCESS CALORIES WITHOUT SERIOUS COMORBIDITY WITH BODY MASS INDEX (BMI) OF 31.0 TO 31.9 IN ADULT: ICD-10-CM

## 2023-11-15 PROCEDURE — 99213 OFFICE O/P EST LOW 20 MIN: CPT

## 2023-11-15 PROCEDURE — G8484 FLU IMMUNIZE NO ADMIN: HCPCS | Performed by: FAMILY MEDICINE

## 2023-11-15 PROCEDURE — 1036F TOBACCO NON-USER: CPT | Performed by: FAMILY MEDICINE

## 2023-11-15 PROCEDURE — 99213 OFFICE O/P EST LOW 20 MIN: CPT | Performed by: FAMILY MEDICINE

## 2023-11-15 PROCEDURE — G8417 CALC BMI ABV UP PARAM F/U: HCPCS | Performed by: FAMILY MEDICINE

## 2023-11-15 PROCEDURE — G8427 DOCREV CUR MEDS BY ELIG CLIN: HCPCS | Performed by: FAMILY MEDICINE

## 2023-11-15 NOTE — PROGRESS NOTES
[address] [facility]  [address] [place] [address]  Phone: [phone]  Fax: [phone]      Date of Visit:  11/15/2023  Patient Name: María Bojorquez   Patient :  1980     HPI:     María Bojorquez is a 39 y.o. female who presents today to discuss   Chief Complaint   Patient presents with    Weight Management     Wants to increase [place]       Patient presents for continued management of weight loss. Patient previously seen on 2023. Previous weight 198 pounds. Current weight 188 pounds. Patient is currently taking Wegovy 1.7 mg. Patient was previously on the 2.4 mg dosing but began experiencing some nausea and vomiting and so the dosage was decreased to 1.7. Patient has been on this 1.7 mg dosing for the past few months and has been tolerating it well. Patient is continuing to see weight loss and has increased energy. Patient is continuing to implement positive diet and lifestyle modifications and increasing exercise. Patient has noticed that she has been feeling more hunger recently and has subsequently been eating a little bit more. She is interested in trialing an increase back to the 2.4 mg dosing. She denies any current nausea, vomiting, diarrhea, constipation or gastrointestinal issues. I personally reviewed the patient's past medical history, current medications, allergies, surgical history, family history and social history. Updates were made as necessary.     REVIEW OF SYSTEM      As per HPI  REVIEWED INFORMATION      No Known Allergies    Patient Active Problem List   Diagnosis    Thyroid enlarged    Abdominal pain    Dysuria    Vaginal discharge    Herpes dermatitis    Herpes genitalis in women    Vulvar lesion    Tongue lesion    Acute cystitis with hematuria    Chronic bilateral low back pain with bilateral sciatica    Irritable bowel syndrome       Past Medical History:   Diagnosis Date    Abnormal menses

## 2023-11-15 NOTE — PATIENT INSTRUCTIONS
Thank you for following up with us at [facility] outpatient residency clinic. It was a pleasure to see you today. Our plan is the following:  -Congratulations on your continued weight loss. Keep up the great work. Remember to maintain adequate protein intake to build muscle mass which will help in maintaining the weight loss.  -We will increase the dosage to 2.4 mg. If you notice any vomiting or gastrointestinal symptoms, please let me know and we can always return to the 1.7 mg dose. - Lets follow-up in 3 months to see how it is working for you. If you have any additional questions or concerns, please call the office ([phone]) and speak to one of the staff. They will triage and forward the message to the doctors. Have a great rest of your day.

## 2024-01-11 NOTE — PROGRESS NOTES
[facility] Family Medicine Residency  [address] [place] [address]  Phone: [phone]  Fax: [phone]      Date of Visit:  24  Patient Name: Naima Malik   Patient :  1980     HPI:     Naima Malik is a 43 y.o. female with   Patient Active Problem List   Diagnosis    Thyroid enlarged    Abdominal pain    Dysuria    Vaginal discharge    Herpes dermatitis    Herpes genitalis in women    Vulvar lesion    Tongue lesion    Acute cystitis with hematuria    Chronic bilateral low back pain with bilateral sciatica    Irritable bowel syndrome     who presents today to discuss   Chief Complaint   Patient presents with    Vaginal Discharge     Vag discharge itching x 7 days tried yeast medication OTC did not help        Recent labwork:  Hospital Outpatient Visit on 2024   Component Date Value Ref Range Status    Specimen Description 2024 .VAGINAL SPECIMEN   Final    C. trachomatis DNA 2024 NEGATIVE  NEGATIVE Final    Comment: CHLAMYDIA TRACHOMATIS DNA not detected by nucleic acid amplification.        This test is intended for medical purposes only and is not valid for the evaluation of   suspected sexual abuse or for other forensic purposes.  In certain contexts, culture may be required to meet applicable laws and regulations for   diagnosis of C. trachomatis and N. gonorrhoeae infections.  Per 2014  CDC recommendations, this test does not include confirmation of positive results   by an alternative nucleic acid target.      N. gonorrhoeae DNA 2024 NEGATIVE  NEGATIVE Final    Comment: NEISSERIA GONORRHOEAE DNA not detected by nucleic acid amplification.        This test is intended for medical purposes only and is not valid for the evaluation of   suspected sexual abuse or for other forensic purposes.  In certain contexts, culture may be required to meet applicable laws and regulations for   diagnosis of C. trachomatis and N.

## 2024-01-12 ENCOUNTER — OFFICE VISIT (OUTPATIENT)
Age: 44
End: 2024-01-12
Payer: COMMERCIAL

## 2024-01-12 ENCOUNTER — HOSPITAL ENCOUNTER (OUTPATIENT)
Age: 44
Setting detail: SPECIMEN
Discharge: HOME | End: 2024-01-12

## 2024-01-12 VITALS
SYSTOLIC BLOOD PRESSURE: 130 MMHG | BODY MASS INDEX: 31.78 KG/M2 | HEART RATE: 71 BPM | RESPIRATION RATE: 12 BRPM | DIASTOLIC BLOOD PRESSURE: 74 MMHG | WEIGHT: 191 LBS

## 2024-01-12 DIAGNOSIS — G89.29 CHRONIC BILATERAL LOW BACK PAIN WITH BILATERAL SCIATICA: ICD-10-CM

## 2024-01-12 DIAGNOSIS — M54.42 CHRONIC BILATERAL LOW BACK PAIN WITH BILATERAL SCIATICA: ICD-10-CM

## 2024-01-12 DIAGNOSIS — M54.41 CHRONIC BILATERAL LOW BACK PAIN WITH BILATERAL SCIATICA: ICD-10-CM

## 2024-01-12 DIAGNOSIS — N89.8 VAGINAL DISCHARGE: Primary | ICD-10-CM

## 2024-01-12 DIAGNOSIS — K58.9 IRRITABLE BOWEL SYNDROME, UNSPECIFIED TYPE: ICD-10-CM

## 2024-01-12 LAB
C TRACH DNA SPEC QL PROBE+SIG AMP: NORMAL
N GONORRHOEA DNA SPEC QL PROBE+SIG AMP: NORMAL
SPECIMEN DESCRIPTION: NORMAL

## 2024-01-12 PROCEDURE — G8417 CALC BMI ABV UP PARAM F/U: HCPCS | Performed by: FAMILY MEDICINE

## 2024-01-12 PROCEDURE — 1036F TOBACCO NON-USER: CPT | Performed by: FAMILY MEDICINE

## 2024-01-12 PROCEDURE — 99211 OFF/OP EST MAY X REQ PHY/QHP: CPT | Performed by: FAMILY MEDICINE

## 2024-01-12 PROCEDURE — 99213 OFFICE O/P EST LOW 20 MIN: CPT | Performed by: FAMILY MEDICINE

## 2024-01-12 PROCEDURE — 87210 SMEAR WET MOUNT SALINE/INK: CPT | Performed by: FAMILY MEDICINE

## 2024-01-12 PROCEDURE — G8484 FLU IMMUNIZE NO ADMIN: HCPCS | Performed by: FAMILY MEDICINE

## 2024-01-12 PROCEDURE — G8427 DOCREV CUR MEDS BY ELIG CLIN: HCPCS | Performed by: FAMILY MEDICINE

## 2024-01-12 RX ORDER — FLUCONAZOLE 150 MG/1
150 TABLET ORAL
Qty: 2 TABLET | Refills: 0 | Status: SHIPPED | OUTPATIENT
Start: 2024-01-12 | End: 2024-01-23

## 2024-01-12 RX ORDER — METRONIDAZOLE 500 MG/1
500 TABLET ORAL 2 TIMES DAILY
Qty: 14 TABLET | Refills: 0 | Status: SHIPPED | OUTPATIENT
Start: 2024-01-12 | End: 2024-01-19

## 2024-01-12 ASSESSMENT — PATIENT HEALTH QUESTIONNAIRE - PHQ9
SUM OF ALL RESPONSES TO PHQ QUESTIONS 1-9: 0
1. LITTLE INTEREST OR PLEASURE IN DOING THINGS: 0
SUM OF ALL RESPONSES TO PHQ QUESTIONS 1-9: 0
SUM OF ALL RESPONSES TO PHQ QUESTIONS 1-9: 0
SUM OF ALL RESPONSES TO PHQ9 QUESTIONS 1 & 2: 0
2. FEELING DOWN, DEPRESSED OR HOPELESS: 0
SUM OF ALL RESPONSES TO PHQ QUESTIONS 1-9: 0

## 2024-01-15 LAB
C TRACH DNA SPEC QL PROBE+SIG AMP: NEGATIVE
N GONORRHOEA DNA SPEC QL PROBE+SIG AMP: NEGATIVE
SPECIMEN DESCRIPTION: NORMAL

## 2024-01-22 ASSESSMENT — ENCOUNTER SYMPTOMS
BLOOD IN STOOL: 0
CHOKING: 0
CHEST TIGHTNESS: 0
EYE REDNESS: 0
RHINORRHEA: 0
SHORTNESS OF BREATH: 0
SINUS PAIN: 0
SORE THROAT: 0
WHEEZING: 0
DIARRHEA: 0
ABDOMINAL PAIN: 0
NAUSEA: 0

## 2024-01-22 NOTE — ASSESSMENT & PLAN NOTE
Exam most consistent with vaginal discharge most likely vaginosis.  Have performed swab for chlamydia and GC however believe it will be negative based on wet mount.  Will treat empirically.  Await the results of culture.

## 2024-01-27 ENCOUNTER — PATIENT MESSAGE (OUTPATIENT)
Age: 44
End: 2024-01-27

## 2024-01-27 DIAGNOSIS — B96.89 BV (BACTERIAL VAGINOSIS): Primary | ICD-10-CM

## 2024-01-27 DIAGNOSIS — N76.0 BV (BACTERIAL VAGINOSIS): Primary | ICD-10-CM

## 2024-01-30 NOTE — TELEPHONE ENCOUNTER
Spoke with pt, we suggested she make an appt since Citlali's recent note  when he saw her said to be seen if she was not better...  She said still has the \"discharge\" but was off the Flagyl while she was throwing up from the Wegovy so she has re-started the Flagyl once she thought the Wegovy was out of her system.....  She can only come in after 3 pm...  There is not a lot open for an after 3pm possible pelvic? So wasn't sure if she had to be seen...  Should she be seen or do you suggest something else?

## 2024-01-30 NOTE — TELEPHONE ENCOUNTER
Please call patient and clarify   Is she still having symptoms  What antibiotic medication was she on and has she completed the course?  Is the wegovy still giving her nausea/vomiting?   Thank you

## 2024-01-31 RX ORDER — AMOXICILLIN 875 MG/1
875 TABLET, COATED ORAL 2 TIMES DAILY
Qty: 14 TABLET | Refills: 0 | Status: SHIPPED | OUTPATIENT
Start: 2024-01-31 | End: 2024-02-07

## 2024-01-31 NOTE — TELEPHONE ENCOUNTER
Instead of Flagyl lets go with amoxicillin.  She has no known drug allergies.  It is possible that she is 1 of those individuals just that can just not tolerate the Flagyl.  Will send in prescription for amoxicillin for 10 days.  Negative testing and wet mount consistent with bacterial vaginosis amoxicillin will do just fine.

## 2024-02-23 RX ORDER — DICYCLOMINE HCL 20 MG
TABLET ORAL
Qty: 120 TABLET | Refills: 3 | Status: SHIPPED | OUTPATIENT
Start: 2024-02-23

## 2024-03-15 ENCOUNTER — HOSPITAL ENCOUNTER (OUTPATIENT)
Age: 44
Setting detail: SPECIMEN
Discharge: HOME | End: 2024-03-15

## 2024-03-15 ENCOUNTER — OFFICE VISIT (OUTPATIENT)
Age: 44
End: 2024-03-15
Payer: COMMERCIAL

## 2024-03-15 VITALS
WEIGHT: 182.4 LBS | DIASTOLIC BLOOD PRESSURE: 81 MMHG | RESPIRATION RATE: 12 BRPM | BODY MASS INDEX: 30.35 KG/M2 | HEART RATE: 68 BPM | SYSTOLIC BLOOD PRESSURE: 104 MMHG

## 2024-03-15 DIAGNOSIS — E66.9 CLASS 1 OBESITY WITHOUT SERIOUS COMORBIDITY WITH BODY MASS INDEX (BMI) OF 30.0 TO 30.9 IN ADULT, UNSPECIFIED OBESITY TYPE: ICD-10-CM

## 2024-03-15 DIAGNOSIS — N89.8 VAGINAL DISCHARGE: ICD-10-CM

## 2024-03-15 DIAGNOSIS — B96.89 BV (BACTERIAL VAGINOSIS): Primary | ICD-10-CM

## 2024-03-15 DIAGNOSIS — N76.0 BV (BACTERIAL VAGINOSIS): Primary | ICD-10-CM

## 2024-03-15 PROCEDURE — 1036F TOBACCO NON-USER: CPT | Performed by: FAMILY MEDICINE

## 2024-03-15 PROCEDURE — G8427 DOCREV CUR MEDS BY ELIG CLIN: HCPCS | Performed by: FAMILY MEDICINE

## 2024-03-15 PROCEDURE — G8484 FLU IMMUNIZE NO ADMIN: HCPCS | Performed by: FAMILY MEDICINE

## 2024-03-15 PROCEDURE — 99211 OFF/OP EST MAY X REQ PHY/QHP: CPT

## 2024-03-15 PROCEDURE — 99214 OFFICE O/P EST MOD 30 MIN: CPT | Performed by: FAMILY MEDICINE

## 2024-03-15 PROCEDURE — G8417 CALC BMI ABV UP PARAM F/U: HCPCS | Performed by: FAMILY MEDICINE

## 2024-03-15 SDOH — SOCIAL STABILITY: SOCIAL NETWORK: ARE YOU MARRIED, WIDOWED, DIVORCED, SEPARATED, NEVER MARRIED, OR LIVING WITH A PARTNER?: NEVER MARRIED

## 2024-03-15 SDOH — HEALTH STABILITY: MENTAL HEALTH
STRESS IS WHEN SOMEONE FEELS TENSE, NERVOUS, ANXIOUS, OR CAN'T SLEEP AT NIGHT BECAUSE THEIR MIND IS TROUBLED. HOW STRESSED ARE YOU?: ONLY A LITTLE

## 2024-03-15 SDOH — SOCIAL STABILITY: SOCIAL INSECURITY: WITHIN THE LAST YEAR, HAVE YOU BEEN AFRAID OF YOUR PARTNER OR EX-PARTNER?: NO

## 2024-03-15 SDOH — HEALTH STABILITY: MENTAL HEALTH: HOW OFTEN DO YOU HAVE A DRINK CONTAINING ALCOHOL?: NEVER

## 2024-03-15 SDOH — ECONOMIC STABILITY: FOOD INSECURITY: WITHIN THE PAST 12 MONTHS, YOU WORRIED THAT YOUR FOOD WOULD RUN OUT BEFORE YOU GOT MONEY TO BUY MORE.: NEVER TRUE

## 2024-03-15 SDOH — ECONOMIC STABILITY: INCOME INSECURITY: IN THE LAST 12 MONTHS, WAS THERE A TIME WHEN YOU WERE NOT ABLE TO PAY THE MORTGAGE OR RENT ON TIME?: NO

## 2024-03-15 SDOH — HEALTH STABILITY: MENTAL HEALTH: HOW MANY STANDARD DRINKS CONTAINING ALCOHOL DO YOU HAVE ON A TYPICAL DAY?: PATIENT DOES NOT DRINK

## 2024-03-15 SDOH — ECONOMIC STABILITY: INCOME INSECURITY: HOW HARD IS IT FOR YOU TO PAY FOR THE VERY BASICS LIKE FOOD, HOUSING, MEDICAL CARE, AND HEATING?: NOT VERY HARD

## 2024-03-15 SDOH — SOCIAL STABILITY: SOCIAL INSECURITY
WITHIN THE LAST YEAR, HAVE YOU BEEN KICKED, HIT, SLAPPED, OR OTHERWISE PHYSICALLY HURT BY YOUR PARTNER OR EX-PARTNER?: NO

## 2024-03-15 SDOH — SOCIAL STABILITY: SOCIAL INSECURITY: WITHIN THE LAST YEAR, HAVE YOU BEEN HUMILIATED OR EMOTIONALLY ABUSED IN OTHER WAYS BY YOUR PARTNER OR EX-PARTNER?: NO

## 2024-03-15 SDOH — HEALTH STABILITY: PHYSICAL HEALTH: ON AVERAGE, HOW MANY DAYS PER WEEK DO YOU ENGAGE IN MODERATE TO STRENUOUS EXERCISE (LIKE A BRISK WALK)?: 0 DAYS

## 2024-03-15 SDOH — SOCIAL STABILITY: SOCIAL INSECURITY
WITHIN THE LAST YEAR, HAVE TO BEEN RAPED OR FORCED TO HAVE ANY KIND OF SEXUAL ACTIVITY BY YOUR PARTNER OR EX-PARTNER?: NO

## 2024-03-15 SDOH — SOCIAL STABILITY: SOCIAL NETWORK: HOW OFTEN DO YOU ATTENT MEETINGS OF THE CLUB OR ORGANIZATION YOU BELONG TO?: NEVER

## 2024-03-15 SDOH — ECONOMIC STABILITY: FOOD INSECURITY: WITHIN THE PAST 12 MONTHS, THE FOOD YOU BOUGHT JUST DIDN'T LAST AND YOU DIDN'T HAVE MONEY TO GET MORE.: NEVER TRUE

## 2024-03-15 SDOH — ECONOMIC STABILITY: HOUSING INSECURITY: IN THE LAST 12 MONTHS, HOW MANY PLACES HAVE YOU LIVED?: 1

## 2024-03-15 SDOH — SOCIAL STABILITY: SOCIAL NETWORK
DO YOU BELONG TO ANY CLUBS OR ORGANIZATIONS SUCH AS CHURCH GROUPS UNIONS, FRATERNAL OR ATHLETIC GROUPS, OR SCHOOL GROUPS?: NO

## 2024-03-15 SDOH — SOCIAL STABILITY: SOCIAL NETWORK
IN A TYPICAL WEEK, HOW MANY TIMES DO YOU TALK ON THE PHONE WITH FAMILY, FRIENDS, OR NEIGHBORS?: MORE THAN THREE TIMES A WEEK

## 2024-03-15 SDOH — SOCIAL STABILITY: SOCIAL NETWORK: HOW OFTEN DO YOU GET TOGETHER WITH FRIENDS OR RELATIVES?: ONCE A WEEK

## 2024-03-15 SDOH — ECONOMIC STABILITY: TRANSPORTATION INSECURITY
IN THE PAST 12 MONTHS, HAS LACK OF TRANSPORTATION KEPT YOU FROM MEETINGS, WORK, OR FROM GETTING THINGS NEEDED FOR DAILY LIVING?: NO

## 2024-03-15 SDOH — ECONOMIC STABILITY: TRANSPORTATION INSECURITY
IN THE PAST 12 MONTHS, HAS THE LACK OF TRANSPORTATION KEPT YOU FROM MEDICAL APPOINTMENTS OR FROM GETTING MEDICATIONS?: NO

## 2024-03-15 SDOH — HEALTH STABILITY: PHYSICAL HEALTH: ON AVERAGE, HOW MANY MINUTES DO YOU ENGAGE IN EXERCISE AT THIS LEVEL?: 0 MIN

## 2024-03-15 SDOH — SOCIAL STABILITY: SOCIAL NETWORK: HOW OFTEN DO YOU ATTEND CHURCH OR RELIGIOUS SERVICES?: MORE THAN 4 TIMES PER YEAR

## 2024-03-15 NOTE — PATIENT INSTRUCTIONS
Rodrigo Malik --    Thank you for following up with us at the [facility] Family Medicine Residency Clinic!  It was a pleasure to care for you today.    Below is a brief summary of the plan we discussed during your visit:  We sent out a vaginal DNA Probe to evaluate for infectious causes not visible on our microscope.  I will let you know what the results say.    If you have any additional questions or concerns, please call the office ([phone]) and speak to one of the staff.  They will triage and forward the message to the doctors.  Have a great rest of your day!     Best Life. Best Health.   -- Ricci Woodard D.O.

## 2024-03-15 NOTE — PROGRESS NOTES
[facility]  [address] [place] [address]  Phone: [phone]  Fax: [phone]      Date of Visit:  3/25/2024  Patient Name: Naima Malik   Patient :  1980     HPI:     Naima Malik is a 43 y.o. female who presents today to discuss   Chief Complaint   Patient presents with    Vaginal Discharge     Vaginal discharge and dryness. Was treated for BV treated with Flagyl then Amoxicillin did get better, now has a white discharge.        Patient has had recurrent vaginal discharge, and states that she keeps getting BV.  She is still having the same symptoms, described as a thick white discharge on her underwear.  She denies any foul odor or \"fishy\" smell, but does report vaginal dryness.  Not currently sexually active.  At her last visit, she had a pelvic exam.  GC swabs were done, and were negative.  She was treated empirically for BV with Metronidazole.      In terms of her obesity, she is currently taking Wegovy 1.7 mg.  Her blood glucose has been well controlled, she has seen good results from the medications, and she denies any adverse effects at this time.      Aside from her current genitourinary symptoms, patient reports no acute concerns today.  She denies any chest pain, shortness of breath, abdominal pain, vaginal bleeding, nausea, or vomiting.     I personally reviewed the patient's past medical history, current medications, allergies, surgical history, family history and social history.  Updates were made as necessary.    REVIEW OF SYSTEM      Review of Systems   Constitutional:  Negative for chills, fatigue and fever.   HENT:  Negative for congestion, rhinorrhea and sore throat.    Respiratory:  Negative for cough and shortness of breath.    Cardiovascular:  Negative for chest pain and leg swelling.   Gastrointestinal:  Negative for abdominal pain, diarrhea, nausea and vomiting.   Genitourinary:  Positive for vaginal

## 2024-03-16 DIAGNOSIS — N89.8 VAGINAL DISCHARGE: ICD-10-CM

## 2024-03-16 LAB
CANDIDA SPECIES: NEGATIVE
GARDNERELLA VAGINALIS: POSITIVE
SOURCE: ABNORMAL
TRICHOMONAS: NEGATIVE

## 2024-03-21 RX ORDER — CLINDAMYCIN PHOSPHATE 20 MG/G
CREAM VAGINAL
Qty: 40 G | Refills: 0 | Status: CANCELLED | OUTPATIENT
Start: 2024-03-21 | End: 2024-03-28

## 2024-03-24 RX ORDER — METRONIDAZOLE 500 MG/1
500 TABLET ORAL 2 TIMES DAILY
Qty: 14 TABLET | Refills: 0 | Status: SHIPPED | OUTPATIENT
Start: 2024-03-24 | End: 2024-03-31

## 2024-03-25 ENCOUNTER — TELEPHONE (OUTPATIENT)
Age: 44
End: 2024-03-25

## 2024-03-25 DIAGNOSIS — N76.0 BV (BACTERIAL VAGINOSIS): Primary | ICD-10-CM

## 2024-03-25 DIAGNOSIS — B37.9 ANTIBIOTIC-INDUCED YEAST INFECTION: ICD-10-CM

## 2024-03-25 DIAGNOSIS — B96.89 BV (BACTERIAL VAGINOSIS): Primary | ICD-10-CM

## 2024-03-25 DIAGNOSIS — T36.95XA ANTIBIOTIC-INDUCED YEAST INFECTION: ICD-10-CM

## 2024-03-25 RX ORDER — FLUCONAZOLE 150 MG/1
150 TABLET ORAL ONCE
Qty: 2 TABLET | Refills: 0 | Status: SHIPPED | OUTPATIENT
Start: 2024-03-25 | End: 2024-03-25

## 2024-03-25 NOTE — TELEPHONE ENCOUNTER
Patient states she was put on an antibiotic today and always gets a yeast infection with them.  Asking if you will prescribe antibiotics. Please advise.    Pharm:  [facility].

## 2024-03-25 NOTE — TELEPHONE ENCOUNTER
An antifungal prescription was sent to the patient's pharmacy.  Please call the patient and tell her to take this medication only if she does develop a yeast infection.  She has been prescribed 2 doses.  Please instruct the patient to take 1 dose on day 1 of yeast infection, and to take the second dose on day 4 if symptoms still have not resolved.    Thank you.

## 2024-05-06 ENCOUNTER — OFFICE VISIT (OUTPATIENT)
Age: 44
End: 2024-05-06
Payer: COMMERCIAL

## 2024-05-06 VITALS
WEIGHT: 174 LBS | DIASTOLIC BLOOD PRESSURE: 88 MMHG | TEMPERATURE: 97.9 F | HEART RATE: 75 BPM | SYSTOLIC BLOOD PRESSURE: 158 MMHG | BODY MASS INDEX: 28.96 KG/M2 | RESPIRATION RATE: 14 BRPM

## 2024-05-06 DIAGNOSIS — D50.8 OTHER IRON DEFICIENCY ANEMIA: ICD-10-CM

## 2024-05-06 DIAGNOSIS — R53.83 OTHER FATIGUE: ICD-10-CM

## 2024-05-06 DIAGNOSIS — E66.09 CLASS 1 OBESITY DUE TO EXCESS CALORIES WITHOUT SERIOUS COMORBIDITY WITH BODY MASS INDEX (BMI) OF 31.0 TO 31.9 IN ADULT: Primary | ICD-10-CM

## 2024-05-06 DIAGNOSIS — B35.1 ONYCHOMYCOSIS: ICD-10-CM

## 2024-05-06 PROCEDURE — G8417 CALC BMI ABV UP PARAM F/U: HCPCS

## 2024-05-06 PROCEDURE — 99214 OFFICE O/P EST MOD 30 MIN: CPT

## 2024-05-06 PROCEDURE — G8427 DOCREV CUR MEDS BY ELIG CLIN: HCPCS

## 2024-05-06 PROCEDURE — 1036F TOBACCO NON-USER: CPT

## 2024-05-06 PROCEDURE — 99213 OFFICE O/P EST LOW 20 MIN: CPT

## 2024-05-06 RX ORDER — SEMAGLUTIDE 1.7 MG/.75ML
1.7 INJECTION, SOLUTION SUBCUTANEOUS
Qty: 9 ML | Refills: 0 | Status: SHIPPED | OUTPATIENT
Start: 2024-05-06 | End: 2024-07-23

## 2024-05-06 RX ORDER — FLUCONAZOLE 150 MG/1
TABLET ORAL
COMMUNITY
Start: 2024-03-30

## 2024-05-06 NOTE — PATIENT INSTRUCTIONS
[facility]    Hospitalist Progress Note    Assessment & Plan   75 year old male who was admitted on 10/22/2021 with confusion and agitation, and suicidal comments.      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, has lost > 20 lbs the last 1-2 yrs   -- intermittent negative delusions   -- sleeping better appetite improved, has not formally been seen by psych with wife agreeable to outpatient appointment with Carolyn     Cognitive impairment with Agitation -- possible undiagnosed  Dementia, vs depression affecting cognition    -- stop Clonazepam, suspect worsening confusion   -- much improved today, no evidence of dementia as depression improves     Active Problems:    Urinary retention -- S/P Mora 10/24/21 -- ER note says 400 ml of urine when mora placed     -- has known BPH and was scheduled to have surgery with Dr. Nasim Redman at [place] this Thurs 10/28/21, left message with his office -- anticipate he will still be hospitalized    -- Mora out today, was able to urinate   -- discussed with [place] urology, will stop Uroxatrol and go back to Flomax 0.8 mg daily      Left leg below knee DVT -- seen on US at [place] this past month, treated with compression stockings   -- on Lovenox 40 mg subcutaneous   -- repeat bilat leg US with no evidence of DVT       Moderate Malnutrition -- 2nd to depression (per dietician)    Plan:  As above, discussed with wife -- not formally seen by Psych -- [facility] Dr[facility], but he reports unable to see patient.     DVT Prophylaxis: Pneumatic Compression Devices  Code Status: Full Code    Disposition: Medically stable to discharge to Geriatric Psych unit once bed available.  Discussed with wife, plan discharge tomorrow.      Les Hoang  Pager [phone]  Cell Phone  Thank you for following up with us at [facility] outpatient residency clinic. It was a pleasure to see you today.    Our plan is the following:  -A refill for Wegovy has been sent to the pharmacy.  I am glad it is working well for you.  -A CMP has been ordered to check liver levels before starting the toenail fungus medication.  The medication will be sent to your pharmacy that you can take once daily.  -Lab work has also been ordered to check your iron and vitamin D levels.  -Once the [facility] paperwork has been emailed to the office, I will complete it and have it faxed to your employer.     If you have any additional questions or concerns, please call the office ([phone]) and speak to one of the staff. They will triage and forward the message to the doctors. Have a great rest of your day.     [phone]  Text Page (7am to 6pm)    Interval History   Slept through the night, woke twice but went back to sleep.      Physical Exam   Temp: 98.2  F (36.8  C) Temp src: Oral BP: 116/70 Pulse: 80   Resp: 18 SpO2: 99 % O2 Device: None (Room air)    Vitals:    10/22/21 1847 10/25/21 0500   Weight: 72.6 kg (160 lb) 68.9 kg (151 lb 14.4 oz)     Vital Signs with Ranges  Temp:  [98.2  F (36.8  C)-98.8  F (37.1  C)] 98.2  F (36.8  C)  Pulse:  [79-83] 80  Resp:  [16-18] 18  BP: (116-122)/(70-88) 116/70  SpO2:  [98 %-99 %] 99 %  I/O last 3 completed shifts:  In: 0   Out: 650 [Urine:650]    # Pain Assessment:  Current Pain Score 10/27/2021   Patient currently in pain? denies   rFLACC pain score -   Ad s pain level was assessed and he currently denies pain.        Constitutional: Awake, alert, cooperative, no apparent distress  Respiratory: Clear to auscultation bilaterally, no crackles or wheezing  Cardiovascular: Regular rate and rhythm, normal S1 and S2, and no murmur noted  GI: Normal bowel sounds, soft, non-distended, non-tender  Extrem: No calf tenderness, no ankle edema  Neuro: alert, Ox3, knew president and  and which state she is from,     Medications       brinzolamide-brimonidine  1 drop Both Eyes BID     enoxaparin ANTICOAGULANT  40 mg Subcutaneous Q24H     ipratropium  2 spray Both Nostrils TID     latanoprost  1 drop Both Eyes QPM     mirtazapine  15 mg Orally disintegrating tablet At Bedtime     multivitamin w/minerals  1 tablet Oral Daily     sennosides  2 tablet Oral BID     [START ON 10/28/2021] tamsulosin  0.8 mg Oral Daily       Data   Recent Labs   Lab 10/26/21  0610 10/24/21  1023 10/22/21  2019   WBC 4.5 4.2 3.6*   HGB 12.9* 13.1* 11.8*   MCV 99 96 96    196 172    139 140   POTASSIUM 4.3 3.8 3.9   CHLORIDE 112* 107 108   CO2 26 25 27   BUN 25 18 19   CR 1.27* 1.02 1.08   ANIONGAP 5 7 5   POOL 9.0 9.0 9.1   * 93 100*   ALBUMIN  --  3.9  --    PROTTOTAL  --  7.7  --     BILITOTAL  --  0.9  --    ALKPHOS  --  47  --    ALT  --  32  --    AST  --  28  --    TROPONIN  --   --  <0.015       Imaging:   No results found for this or any previous visit (from the past 24 hour(s)).

## 2024-05-06 NOTE — PROGRESS NOTES
[facility]  [address] [place] [address]  Phone: [phone]  Fax: [phone]      Date of Visit: 2024  Patient Name: Naima Malik   Patient :  1980     HPI:     Naima Malik is a 44 y.o. female who presents today to discuss   Chief Complaint   Patient presents with    Other     Labwork vits d and iron level, feeling tired again    Nail Problem     Fungus bilateral want rx.    Leg Pain     Wants FMLA to be off 3/4 days for dr. Chu. Currently has two day a week off. Her employer is going to make them work mandatory 10hr days 7 days a week. Patient to see GI,  but has not made appt yet.  Sees no other specialists.     Medication Refill     wegovy     Patient presents for continued management of weight loss as well as onychomycosis of the toenails, iron deficiency, and to have FMLA paperwork filled out.    Patient reports Wegovy has been working well for her and she is continuing to see stable gradual weight loss.  Patient has lost an additional 8 pounds since her previous visit on 3/15/2024.  Patient denies any nausea, vomiting or pancreatitis.  Will continue at current dosage of Wegovy 1.7mg.     Patient is interested in restarting terbinafine for management of the onychomycosis on her toenails.  A CMP was ordered to check liver enzymes prior to initiating terbinafine.    Patient has history of iron deficiency and vitamin D deficiency.  She has noticed some increased fatigue and is interested in having her vitamin D levels checked.  She used to be on a vitamin D supplement up until about 6 months ago.  Patient continues to be on iron every other day.    Patient reports her legs have been bothering her since the rain has started.  Is requesting updated FMLA paperwork to be filled out stating that when her legs are sore and achy that she is unable to be on her feet all day for 10 hours at a time.    1-2 days per month as

## 2024-05-13 RX ORDER — DICYCLOMINE HCL 20 MG
TABLET ORAL
Qty: 360 TABLET | Refills: 0 | Status: SHIPPED | OUTPATIENT
Start: 2024-05-13

## 2024-05-16 ENCOUNTER — TELEPHONE (OUTPATIENT)
Age: 44
End: 2024-05-16

## 2024-05-16 NOTE — TELEPHONE ENCOUNTER
Please see Inbox for FMLA - form needs to be completed and faxed over no later that tomorrow, pt is on the verge of losing her job if not received. Also start date is April 26, 2024. Please call pt when form is completed and faxed.

## 2024-09-13 DIAGNOSIS — E66.09 CLASS 1 OBESITY DUE TO EXCESS CALORIES WITHOUT SERIOUS COMORBIDITY WITH BODY MASS INDEX (BMI) OF 31.0 TO 31.9 IN ADULT: ICD-10-CM

## 2024-09-13 RX ORDER — SEMAGLUTIDE 1.7 MG/.75ML
1.7 INJECTION, SOLUTION SUBCUTANEOUS
Qty: 9 ML | Refills: 0 | Status: SHIPPED | OUTPATIENT
Start: 2024-09-13 | End: 2024-11-30

## 2024-10-28 ENCOUNTER — TELEPHONE (OUTPATIENT)
Age: 44
End: 2024-10-28

## 2024-10-28 NOTE — TELEPHONE ENCOUNTER
Patient called and stated that she has a yeast infection and she wanted to know if you can send in a couple of Diflucan for her?

## 2024-12-18 ENCOUNTER — OFFICE VISIT (OUTPATIENT)
Age: 44
End: 2024-12-18
Payer: COMMERCIAL

## 2024-12-18 VITALS
HEART RATE: 52 BPM | BODY MASS INDEX: 30.32 KG/M2 | SYSTOLIC BLOOD PRESSURE: 147 MMHG | WEIGHT: 182 LBS | HEIGHT: 65 IN | RESPIRATION RATE: 18 BRPM | TEMPERATURE: 98 F | DIASTOLIC BLOOD PRESSURE: 79 MMHG

## 2024-12-18 DIAGNOSIS — Z13.29 THYROID DISORDER SCREEN: ICD-10-CM

## 2024-12-18 DIAGNOSIS — Z11.4 ENCOUNTER FOR SCREENING FOR HIV: ICD-10-CM

## 2024-12-18 DIAGNOSIS — Z13.1 DIABETES MELLITUS SCREENING: ICD-10-CM

## 2024-12-18 DIAGNOSIS — M79.651 RIGHT THIGH PAIN: ICD-10-CM

## 2024-12-18 DIAGNOSIS — B35.1 ONYCHOMYCOSIS: ICD-10-CM

## 2024-12-18 DIAGNOSIS — Z11.59 NEED FOR HEPATITIS C SCREENING TEST: ICD-10-CM

## 2024-12-18 DIAGNOSIS — L98.9 DISORDER OF THE SKIN AND SUBCUTANEOUS TISSUE, UNSPECIFIED: ICD-10-CM

## 2024-12-18 DIAGNOSIS — M25.562 CHRONIC PAIN OF LEFT KNEE: Primary | ICD-10-CM

## 2024-12-18 DIAGNOSIS — E61.1 IRON DEFICIENCY: ICD-10-CM

## 2024-12-18 DIAGNOSIS — K58.9 IRRITABLE BOWEL SYNDROME, UNSPECIFIED TYPE: ICD-10-CM

## 2024-12-18 DIAGNOSIS — G89.29 CHRONIC PAIN OF LEFT KNEE: Primary | ICD-10-CM

## 2024-12-18 DIAGNOSIS — R20.8 BURNING SENSATION: ICD-10-CM

## 2024-12-18 PROCEDURE — G8417 CALC BMI ABV UP PARAM F/U: HCPCS | Performed by: FAMILY MEDICINE

## 2024-12-18 PROCEDURE — 99213 OFFICE O/P EST LOW 20 MIN: CPT | Performed by: FAMILY MEDICINE

## 2024-12-18 PROCEDURE — G8484 FLU IMMUNIZE NO ADMIN: HCPCS | Performed by: FAMILY MEDICINE

## 2024-12-18 PROCEDURE — G8427 DOCREV CUR MEDS BY ELIG CLIN: HCPCS | Performed by: FAMILY MEDICINE

## 2024-12-18 PROCEDURE — 99212 OFFICE O/P EST SF 10 MIN: CPT

## 2024-12-18 PROCEDURE — 1036F TOBACCO NON-USER: CPT | Performed by: FAMILY MEDICINE

## 2024-12-18 NOTE — PROGRESS NOTES
[facility]  [address] [place] [address]  Phone: [phone]  Fax: [phone]      Date of Visit: 2024   Patient Name: Naima Malik   Patient :  1980     ASSESSMENT/PLAN   1. Chronic pain of left knee  2. Right Thigh Pain  -    XR L Knee done 2022 showed normal alignment of L knee, no acute fractures or subluxations, no effusion, and no significant narrowing of joint spaces.  -    Thus XR rules out arthritis of L knee  -    Given chronic knee pain, will refer to physical therapy  -    [facility]  -    Will fill out FMLA form so patient can seek symptom relief with steroid shots as needed  3. IBS     - Controlled with Bentyl     - Patient will continue to see GI as needed  4. Disorder of the skin and subcutaneous tissue, unspecified  5. Burning Sensation        -    Patient denied change in lotions/detergents/soaps, change in routine, exposure to woods/vegetation suggestive of contact dermatitis        -     Patient denied new medication use prior to skin condition that would suggest drug allergy        -    Will order inflammatory and thyroid labs to check for active inflammation and thyroid disorder        -   Will refer to Dermatology for further investigation  -     SHELBY Screen With Reflex; Future  -     Sedimentation Rate; Future  -     C-Reactive Protein; Future  -     TSH With Reflex Ft4; Future  -     AFL - Mansoor Levy MD, Dermatology, [place]  6. Onychomycosis        -     Will order CMP to check liver function before prescribing terbinafine for toenail fungus  7. Iron deficiency        -     Per chart review, patient has history of iron deficiency: Iron 29 and TIBC 8 on Fe & TIBC done 2023; will screen with CBC to see if deficiency still present   -     CBC with Auto Differential; Future  -     If CBC shows microcytosis or microcytic anemia, iron studies will be

## 2024-12-18 NOTE — PATIENT INSTRUCTIONS
Thank you for following up with us at [facility] office. It was a pleasure to meet you today!     Our plan is the following:  L Knee Pain and R Thigh Pain and IBS  Will fill out FMLA  Referral to Physical therapy  Facial skin pain/rash  Ordered Labs: SHELBY, ESR, CRP,  Tests for Toe Nail Fungus/Iron Deficiency/thyroid disease/Diabetes: CBC,CMP, Iron, Ferritin,TIBC, HIV Screen, Hepatitis C Screen, A1c   Refer to dermatology      Your medication list is included in the after visit summary. If you find any differences when compared to your medications at home, please contact the office ([phone]) to let us know.   You can also call the office if you have any additional questions or concerns and speak to one of the staff. They will triage and forward the message to the provider.   Have a great rest of your day!

## 2024-12-27 DIAGNOSIS — E66.09 CLASS 1 OBESITY DUE TO EXCESS CALORIES WITHOUT SERIOUS COMORBIDITY WITH BODY MASS INDEX (BMI) OF 31.0 TO 31.9 IN ADULT: ICD-10-CM

## 2024-12-27 DIAGNOSIS — E66.811 CLASS 1 OBESITY DUE TO EXCESS CALORIES WITHOUT SERIOUS COMORBIDITY WITH BODY MASS INDEX (BMI) OF 31.0 TO 31.9 IN ADULT: ICD-10-CM

## 2024-12-30 RX ORDER — SEMAGLUTIDE 1.7 MG/.75ML
1.7 INJECTION, SOLUTION SUBCUTANEOUS
Qty: 3 ML | Refills: 2 | Status: SHIPPED | OUTPATIENT
Start: 2024-12-30 | End: 2025-03-18

## 2025-01-02 ENCOUNTER — TELEPHONE (OUTPATIENT)
Age: 45
End: 2025-01-02

## 2025-01-02 NOTE — TELEPHONE ENCOUNTER
Completed prior authorization paperwork for Wegovy refill on 01/02/24.  Called patient via phone number on file. Patient confirmed confirms that she is till currently taking Wegovy and wanting a refill.  Patient endorsed that she is continuing to do lifestyle modifications that include dieting and exercising in addition to Wegovy use.

## 2025-01-06 ENCOUNTER — HOSPITAL ENCOUNTER (OUTPATIENT)
Age: 45
Discharge: HOME | End: 2025-01-06
Payer: COMMERCIAL

## 2025-01-06 DIAGNOSIS — Z11.4 ENCOUNTER FOR SCREENING FOR HIV: ICD-10-CM

## 2025-01-06 DIAGNOSIS — Z13.29 THYROID DISORDER SCREEN: ICD-10-CM

## 2025-01-06 DIAGNOSIS — L98.9 DISORDER OF THE SKIN AND SUBCUTANEOUS TISSUE, UNSPECIFIED: ICD-10-CM

## 2025-01-06 DIAGNOSIS — Z13.1 DIABETES MELLITUS SCREENING: ICD-10-CM

## 2025-01-06 DIAGNOSIS — E61.1 IRON DEFICIENCY: ICD-10-CM

## 2025-01-06 DIAGNOSIS — Z11.59 NEED FOR HEPATITIS C SCREENING TEST: ICD-10-CM

## 2025-01-06 DIAGNOSIS — R20.8 BURNING SENSATION: ICD-10-CM

## 2025-01-06 LAB
BASOPHILS # BLD: <0.03 K/UL (ref 0–0.2)
BASOPHILS NFR BLD: 0 % (ref 0–2)
CRP SERPL HS-MCNC: <3 MG/L (ref 0–5)
EOSINOPHIL # BLD: 0.15 K/UL (ref 0–0.44)
EOSINOPHILS RELATIVE PERCENT: 2 % (ref 1–4)
ERYTHROCYTE [DISTWIDTH] IN BLOOD BY AUTOMATED COUNT: 14.1 % (ref 11.8–14.4)
ERYTHROCYTE [SEDIMENTATION RATE] IN BLOOD BY PHOTOMETRIC METHOD: 16 MM/HR (ref 0–20)
EST. AVERAGE GLUCOSE BLD GHB EST-MCNC: 105 MG/DL
HBA1C MFR BLD: 5.3 % (ref 4–6)
HCT VFR BLD AUTO: 44.9 % (ref 36.3–47.1)
HCV AB SERPL QL IA: NONREACTIVE
HGB BLD-MCNC: 15 G/DL (ref 11.9–15.1)
HIV 1+2 AB+HIV1 P24 AG SERPL QL IA: NONREACTIVE
IMM GRANULOCYTES # BLD AUTO: <0.03 K/UL (ref 0–0.3)
IMM GRANULOCYTES NFR BLD: 0 %
LYMPHOCYTES NFR BLD: 3.23 K/UL (ref 1.1–3.7)
LYMPHOCYTES RELATIVE PERCENT: 39 % (ref 24–43)
MCH RBC QN AUTO: 27.7 PG (ref 25.2–33.5)
MCHC RBC AUTO-ENTMCNC: 33.4 G/DL (ref 28.4–34.8)
MCV RBC AUTO: 82.8 FL (ref 82.6–102.9)
MONOCYTES NFR BLD: 0.53 K/UL (ref 0.1–1.2)
MONOCYTES NFR BLD: 6 % (ref 3–12)
NEUTROPHILS NFR BLD: 53 % (ref 36–65)
NEUTS SEG NFR BLD: 4.41 K/UL (ref 1.5–8.1)
NRBC BLD-RTO: 0 PER 100 WBC
PLATELET # BLD AUTO: 308 K/UL (ref 138–453)
PMV BLD AUTO: 10.1 FL (ref 8.1–13.5)
RBC # BLD AUTO: 5.42 M/UL (ref 3.95–5.11)
TSH SERPL DL<=0.05 MIU/L-ACNC: 1.47 UIU/ML (ref 0.27–4.2)
WBC OTHER # BLD: 8.4 K/UL (ref 3.5–11.3)

## 2025-01-06 PROCEDURE — 85652 RBC SED RATE AUTOMATED: CPT

## 2025-01-06 PROCEDURE — 86225 DNA ANTIBODY NATIVE: CPT

## 2025-01-06 PROCEDURE — 85025 COMPLETE CBC W/AUTO DIFF WBC: CPT

## 2025-01-06 PROCEDURE — 84443 ASSAY THYROID STIM HORMONE: CPT

## 2025-01-06 PROCEDURE — 87389 HIV-1 AG W/HIV-1&-2 AB AG IA: CPT

## 2025-01-06 PROCEDURE — 86803 HEPATITIS C AB TEST: CPT

## 2025-01-06 PROCEDURE — 83036 HEMOGLOBIN GLYCOSYLATED A1C: CPT

## 2025-01-06 PROCEDURE — 36415 COLL VENOUS BLD VENIPUNCTURE: CPT

## 2025-01-06 PROCEDURE — 86038 ANTINUCLEAR ANTIBODIES: CPT

## 2025-01-06 PROCEDURE — 86140 C-REACTIVE PROTEIN: CPT

## 2025-01-07 LAB
ANA SER QL IA: NEGATIVE
DSDNA IGG SER QL IA: 2.3 IU/ML
NUCLEAR IGG SER IA-RTO: 0.2 U/ML

## 2025-01-09 ENCOUNTER — TELEPHONE (OUTPATIENT)
Age: 45
End: 2025-01-09

## 2025-01-09 NOTE — TELEPHONE ENCOUNTER
Patient calling in regards to needing a prior authorization done for Wegovy- patient would like an update on the process/timeline on this.     Please advise.

## 2025-01-10 NOTE — TELEPHONE ENCOUNTER
Talked with  staff about completed prior authorization paperwork that was faxed and was informed that it had been denied. Spoke with patient via phone. Informed patient that wegovy prior authorization form had been completed and that insurance had denied wegovy. Patient declined the option to continue wegovy via compound pharmacy.

## 2025-01-20 ENCOUNTER — TELEPHONE (OUTPATIENT)
Age: 45
End: 2025-01-20

## 2025-01-20 DIAGNOSIS — B35.1 ONYCHOMYCOSIS: Primary | ICD-10-CM

## 2025-01-20 NOTE — TELEPHONE ENCOUNTER
Called patient to let her know that her labs were normal/unremarkable on 01/20/24. Ordered CMP to check liver enzymes before prescribing terbinafine for onychomycosis.

## 2025-01-27 RX ORDER — DICYCLOMINE HCL 20 MG
TABLET ORAL
Qty: 360 TABLET | Refills: 0 | Status: SHIPPED | OUTPATIENT
Start: 2025-01-27

## 2025-01-27 NOTE — PATIENT INSTRUCTIONS
I reviewed the information and the Flagyl or metronidazole looks like it is a better agent to get taking care of at the first time.  No mayonnaise or alcohol while on the metronidazole for at least 2 days following.  Diflucan 1 tablet day 1 and repeat in 10 days.  Would like to see you back in 2 weeks if not completely resolved.  As soon as the test results come back we will let you know and that should be available on [employer].  We see this a lot starting around age 45.   Detail Level: Detailed General Sunscreen Counseling: I recommended a broad spectrum sunscreen with a SPF of 30 or higher.  I explained that SPF 30 sunscreens block approximately 97 percent of the sun's harmful rays.  Sunscreens should be applied at least 15 minutes prior to expected sun exposure and then every 2 hours after that as long as sun exposure continues. If swimming or exercising sunscreen should be reapplied every 45 minutes to an hour after getting wet or sweating.  One ounce, or the equivalent of a shot glass full of sunscreen, is adequate to protect the skin not covered by a bathing suit. I also recommended a lip balm with a sunscreen as well. Sun protective clothing can be used in lieu of sunscreen but must be worn the entire time you are exposed to the sun's rays.

## 2025-02-12 ENCOUNTER — TELEPHONE (OUTPATIENT)
Age: 45
End: 2025-02-12

## 2025-02-12 NOTE — TELEPHONE ENCOUNTER
Patient called and wanted to know if she can have an order to have some labs drawn to see if she is going through menopause.

## 2025-02-26 ENCOUNTER — OFFICE VISIT (OUTPATIENT)
Age: 45
End: 2025-02-26
Payer: COMMERCIAL

## 2025-02-26 ENCOUNTER — PATIENT MESSAGE (OUTPATIENT)
Age: 45
End: 2025-02-26

## 2025-02-26 VITALS
TEMPERATURE: 97.8 F | WEIGHT: 186 LBS | HEART RATE: 74 BPM | DIASTOLIC BLOOD PRESSURE: 62 MMHG | HEIGHT: 65 IN | BODY MASS INDEX: 30.99 KG/M2 | RESPIRATION RATE: 22 BRPM | SYSTOLIC BLOOD PRESSURE: 120 MMHG

## 2025-02-26 DIAGNOSIS — B35.1 ONYCHOMYCOSIS: ICD-10-CM

## 2025-02-26 DIAGNOSIS — N95.1 MENOPAUSAL SYMPTOMS: Primary | ICD-10-CM

## 2025-02-26 PROCEDURE — 99212 OFFICE O/P EST SF 10 MIN: CPT

## 2025-02-26 PROCEDURE — 99214 OFFICE O/P EST MOD 30 MIN: CPT | Performed by: FAMILY MEDICINE

## 2025-02-26 PROCEDURE — G8427 DOCREV CUR MEDS BY ELIG CLIN: HCPCS | Performed by: FAMILY MEDICINE

## 2025-02-26 PROCEDURE — 1036F TOBACCO NON-USER: CPT | Performed by: FAMILY MEDICINE

## 2025-02-26 PROCEDURE — G8417 CALC BMI ABV UP PARAM F/U: HCPCS | Performed by: FAMILY MEDICINE

## 2025-02-26 SDOH — ECONOMIC STABILITY: FOOD INSECURITY: WITHIN THE PAST 12 MONTHS, THE FOOD YOU BOUGHT JUST DIDN'T LAST AND YOU DIDN'T HAVE MONEY TO GET MORE.: NEVER TRUE

## 2025-02-26 SDOH — ECONOMIC STABILITY: FOOD INSECURITY: WITHIN THE PAST 12 MONTHS, YOU WORRIED THAT YOUR FOOD WOULD RUN OUT BEFORE YOU GOT MONEY TO BUY MORE.: NEVER TRUE

## 2025-02-26 ASSESSMENT — PATIENT HEALTH QUESTIONNAIRE - PHQ9
SUM OF ALL RESPONSES TO PHQ QUESTIONS 1-9: 0
1. LITTLE INTEREST OR PLEASURE IN DOING THINGS: NOT AT ALL
SUM OF ALL RESPONSES TO PHQ QUESTIONS 1-9: 0
SUM OF ALL RESPONSES TO PHQ QUESTIONS 1-9: 0
2. FEELING DOWN, DEPRESSED OR HOPELESS: NOT AT ALL
SUM OF ALL RESPONSES TO PHQ QUESTIONS 1-9: 0

## 2025-02-26 NOTE — PATIENT INSTRUCTIONS
Thank you for following up with us at [facility] office. It was a pleasure to meet you today!     Our plan is the following:  Menopause  Ordered Pregnancy Test, TSH, FSH, Prolactin  Ordered CMP for Onchomycosis  Return in 1 month or when possible to follow up for menopause and elevated blood pressure readings  Try over the counter vaginal lubricants for vaginal dryness    Your medication list is included in the after visit summary. If you find any differences when compared to your medications at home, please contact the office ([phone]) to let us know.   You can also call the office if you have any additional questions or concerns and speak to one of the staff. They will triage and forward the message to the provider.   Have a great rest of your day!

## 2025-02-26 NOTE — PROGRESS NOTES
Complaint   Patient presents with    Fatigue     Vaginal dryness    Sweats     Nightly/day    Hair/Scalp Problem     Dry scalp and loss      Patient presents for concerns of menopause. She endorsed experiencing experiencing 4 episodes of night sweats for that last 1.5 months. Previously, she was only experiencing night sweats once every 2 weeks. She also endorsed experiencing vaginal dryness that began 3 weeks ago and hair falling out for 3 months. She endorses fatigue, chronic mood swings, feeling sensitive to the heat, history of hysterectomy in 2013 and prior usage of estrogen about 4 years ago for vaginal dryness that was discontinued by OBGYN. She denies palpitations, weight gain w/o change in appetite, vaginal pruritus, vaginal burning, dysuria, current sexual activity, UTI's, anxiety, and depression.    Patient expressed interest in labs for menopause. She also agreed to getting another [facility] order for previous complaint of toe fungus that she had wanted to treat in December 2024 office visit. In that visit, she complained of hard toe nails that are easy break. Her toe fungus had not been relieved with OTC antifungal creams.  REVIEW OF SYSTEM    As noted above in HPI.    PHYSICAL EXAM   /62   Pulse 74   Temp 97.8 °F (36.6 °C) (Oral)   Resp 22   Ht 1.651 m (5' 5\")   Wt 84.4 kg (186 lb)   LMP 12/16/2014   BMI 30.95 kg/m²      Vital signs reviewed. Nursing note reviewed.     General: Alert and oriented, well nourished, in NAD   Thyroid: No goiter; No nodules felt upon palpation of thyroid.  Lungs: Clear to auscultation in bilateral fields, non-labored respiration.  Heart: Normal rate, regular rhythm. No murmur, gallop, or rubs      LABS     Hospital Outpatient Visit on 01/06/2025   Component Date Value Ref Range Status    HIV Ag/Ab 01/06/2025 NONREACTIVE  NONREACTIVE Final    Comment: No laboratory evidence of HIV infection.  If acute HIV infection is suspected, consider   testing for HIV-1 RNA.

## 2025-05-13 ENCOUNTER — TELEPHONE (OUTPATIENT)
Age: 45
End: 2025-05-13

## 2025-06-17 NOTE — PROGRESS NOTES
[facility]  [address] [place] [address]  Phone: [phone]  Fax: [phone]      Date of Visit:  2025  Patient Name: Naima Malik   Patient :  1980     HPI:     Naima Malik is a 45 y.o. female who presents today to discuss   Chief Complaint   Patient presents with    Other     ? Yeast infection, white discharge, not itchy, does not have periods anymore-  Had a darker discharge at first, tongue started too with white coating- around        History of Present Illness  The patient is a 45-year-old female presenting with vaginal discharge.    She reports thin, white, milky discharge since 2025, without odor, itching, dryness, systemic symptoms, or gastrointestinal issues. She is not sexually active. No recent antibiotic use. History of bacterial vaginosis (BV).  Surgical history pertinent for hysterectomy including cervix and bilateral salpingectomy in .    Patient also complains of a white coating on her tongue that started around the same time.  Was able to scrape it off.  Reports this has spontaneously resolved and is not present today.    I personally reviewed the patient's past medical history, current medications, allergies, surgical history, family history and social history.  Updates were made as necessary.    REVIEW OF SYSTEM      Review of Systems   Constitutional:  Negative for chills and fever.   HENT:          White coating on tongue   Respiratory:  Negative for shortness of breath.    Cardiovascular:  Negative for chest pain.   Gastrointestinal:  Negative for abdominal pain, constipation, diarrhea, nausea and vomiting.   Genitourinary:  Positive for vaginal discharge. Negative for dysuria, frequency, pelvic pain, vaginal bleeding and vaginal pain.   All other systems reviewed and are negative.      REVIEWED INFORMATION      No Known Allergies    Patient Active Problem List   Diagnosis

## 2025-06-18 ENCOUNTER — OFFICE VISIT (OUTPATIENT)
Age: 45
End: 2025-06-18

## 2025-06-18 VITALS
HEART RATE: 76 BPM | WEIGHT: 199 LBS | SYSTOLIC BLOOD PRESSURE: 155 MMHG | TEMPERATURE: 98.7 F | HEIGHT: 65 IN | BODY MASS INDEX: 33.15 KG/M2 | RESPIRATION RATE: 16 BRPM | DIASTOLIC BLOOD PRESSURE: 77 MMHG

## 2025-06-18 DIAGNOSIS — B96.89 BACTERIAL VAGINOSIS: Primary | ICD-10-CM

## 2025-06-18 DIAGNOSIS — N76.0 BACTERIAL VAGINOSIS: Primary | ICD-10-CM

## 2025-06-18 DIAGNOSIS — B37.9 ANTIBIOTIC-INDUCED YEAST INFECTION: ICD-10-CM

## 2025-06-18 DIAGNOSIS — T36.95XA ANTIBIOTIC-INDUCED YEAST INFECTION: ICD-10-CM

## 2025-06-18 LAB
CLUE CELLS: POSITIVE
HYPHAL YEAST: ABNORMAL
TRICHOMONAS: NEGATIVE
WBC, WET MOUNT, POC: ABNORMAL
WET PREP: ABNORMAL

## 2025-06-18 RX ORDER — FLUCONAZOLE 150 MG/1
150 TABLET ORAL
Qty: 2 TABLET | Refills: 0 | Status: SHIPPED | OUTPATIENT
Start: 2025-06-18 | End: 2025-06-24

## 2025-06-18 RX ORDER — METRONIDAZOLE 500 MG/1
500 TABLET ORAL 2 TIMES DAILY
Qty: 14 TABLET | Refills: 0 | Status: SHIPPED | OUTPATIENT
Start: 2025-06-18 | End: 2025-06-25

## 2025-06-18 RX ORDER — FLUCONAZOLE 150 MG/1
150 TABLET ORAL
Qty: 2 TABLET | Refills: 0 | Status: CANCELLED | OUTPATIENT
Start: 2025-06-18 | End: 2025-06-24

## 2025-06-18 RX ORDER — NYSTATIN 100000 [USP'U]/ML
500000 SUSPENSION ORAL 4 TIMES DAILY
Qty: 200 ML | Refills: 0 | Status: CANCELLED | OUTPATIENT
Start: 2025-06-18 | End: 2025-06-28

## 2025-06-18 ASSESSMENT — ENCOUNTER SYMPTOMS
CONSTIPATION: 0
ABDOMINAL PAIN: 0
SHORTNESS OF BREATH: 0
VOMITING: 0
NAUSEA: 0
DIARRHEA: 0

## 2025-06-18 NOTE — PATIENT INSTRUCTIONS
Thank you for following up with us at [facility] outpatient residency clinic! It was a pleasure to meet you today!     Our plan is the following:  -I am going to send in both Flagyl and Diflucan to your pharmacy.  Take the Flagyl for the bacterial vaginosis.  If you develop a yeast infection take the Diflucan.    If you have any additional questions or concerns, please call the office ([phone]) and speak to one of the staff. They will triage and forward the message to the doctors! Have a great rest of your day!

## 2025-08-13 ENCOUNTER — OFFICE VISIT (OUTPATIENT)
Age: 45
End: 2025-08-13
Payer: COMMERCIAL

## 2025-08-13 VITALS
WEIGHT: 206.6 LBS | HEIGHT: 64 IN | SYSTOLIC BLOOD PRESSURE: 116 MMHG | RESPIRATION RATE: 16 BRPM | BODY MASS INDEX: 35.27 KG/M2 | HEART RATE: 65 BPM | DIASTOLIC BLOOD PRESSURE: 70 MMHG | TEMPERATURE: 97.8 F

## 2025-08-13 DIAGNOSIS — E66.812 CLASS 2 OBESITY DUE TO EXCESS CALORIES WITH BODY MASS INDEX (BMI) OF 35.0 TO 35.9 IN ADULT, UNSPECIFIED WHETHER SERIOUS COMORBIDITY PRESENT: Primary | ICD-10-CM

## 2025-08-13 DIAGNOSIS — E66.09 CLASS 2 OBESITY DUE TO EXCESS CALORIES WITH BODY MASS INDEX (BMI) OF 35.0 TO 35.9 IN ADULT, UNSPECIFIED WHETHER SERIOUS COMORBIDITY PRESENT: Primary | ICD-10-CM

## 2025-08-13 DIAGNOSIS — M25.512 ACUTE PAIN OF LEFT SHOULDER: ICD-10-CM

## 2025-08-13 PROCEDURE — 1036F TOBACCO NON-USER: CPT

## 2025-08-13 PROCEDURE — 99214 OFFICE O/P EST MOD 30 MIN: CPT

## 2025-08-13 PROCEDURE — G8417 CALC BMI ABV UP PARAM F/U: HCPCS

## 2025-08-13 PROCEDURE — G8427 DOCREV CUR MEDS BY ELIG CLIN: HCPCS

## 2025-08-13 RX ORDER — OMEPRAZOLE 20 MG/1
20 CAPSULE, DELAYED RELEASE ORAL DAILY
COMMUNITY

## 2025-08-13 RX ORDER — ACETAMINOPHEN 500 MG
500 TABLET ORAL EVERY 4 HOURS PRN
COMMUNITY

## 2025-08-13 ASSESSMENT — LIFESTYLE VARIABLES
HOW OFTEN DO YOU HAVE A DRINK CONTAINING ALCOHOL: MONTHLY OR LESS
HOW MANY STANDARD DRINKS CONTAINING ALCOHOL DO YOU HAVE ON A TYPICAL DAY: 5 OR 6

## 2025-08-19 ENCOUNTER — OFFICE VISIT (OUTPATIENT)
Age: 45
End: 2025-08-19

## 2025-08-19 VITALS — BODY MASS INDEX: 35.17 KG/M2 | HEIGHT: 64 IN | WEIGHT: 206 LBS

## 2025-08-19 DIAGNOSIS — M25.512 LEFT SHOULDER PAIN, UNSPECIFIED CHRONICITY: ICD-10-CM

## 2025-08-19 DIAGNOSIS — M25.812 SHOULDER IMPINGEMENT, LEFT: Primary | ICD-10-CM
